# Patient Record
Sex: FEMALE | Race: WHITE | NOT HISPANIC OR LATINO | Employment: FULL TIME | URBAN - METROPOLITAN AREA
[De-identification: names, ages, dates, MRNs, and addresses within clinical notes are randomized per-mention and may not be internally consistent; named-entity substitution may affect disease eponyms.]

---

## 2020-05-11 ENCOUNTER — TELEPHONE (OUTPATIENT)
Dept: FAMILY MEDICINE CLINIC | Facility: CLINIC | Age: 55
End: 2020-05-11

## 2020-05-11 DIAGNOSIS — I10 ESSENTIAL HYPERTENSION: Primary | ICD-10-CM

## 2020-05-11 RX ORDER — METOPROLOL SUCCINATE 50 MG/1
50 TABLET, EXTENDED RELEASE ORAL DAILY
Qty: 30 TABLET | Refills: 5 | Status: SHIPPED | OUTPATIENT
Start: 2020-05-11 | End: 2020-07-01 | Stop reason: SDUPTHER

## 2020-05-11 RX ORDER — ALPRAZOLAM 0.5 MG/1
0.5 TABLET ORAL 2 TIMES DAILY PRN
Qty: 60 TABLET | Refills: 0 | Status: SHIPPED | OUTPATIENT
Start: 2020-05-11 | End: 2020-07-01 | Stop reason: SDUPTHER

## 2020-07-01 ENCOUNTER — TELEPHONE (OUTPATIENT)
Dept: FAMILY MEDICINE CLINIC | Facility: CLINIC | Age: 55
End: 2020-07-01

## 2020-07-01 DIAGNOSIS — I10 ESSENTIAL HYPERTENSION: ICD-10-CM

## 2020-07-01 RX ORDER — METOPROLOL SUCCINATE 50 MG/1
50 TABLET, EXTENDED RELEASE ORAL DAILY
Qty: 30 TABLET | Refills: 5 | Status: SHIPPED | OUTPATIENT
Start: 2020-07-01 | End: 2021-07-12

## 2020-07-01 RX ORDER — ALPRAZOLAM 0.5 MG/1
0.5 TABLET ORAL 2 TIMES DAILY PRN
Qty: 60 TABLET | Refills: 2 | Status: SHIPPED | OUTPATIENT
Start: 2020-07-01 | End: 2020-10-07

## 2020-07-01 NOTE — TELEPHONE ENCOUNTER
Needs a refill for her Alprazolam 0 5mg, 2x's a day as needed    [address] [place] [address],[facility] Floor    Patients ph # [phone]

## 2020-10-06 ENCOUNTER — TELEPHONE (OUTPATIENT)
Dept: FAMILY MEDICINE CLINIC | Facility: CLINIC | Age: 55
End: 2020-10-06

## 2020-10-07 DIAGNOSIS — I10 ESSENTIAL HYPERTENSION: ICD-10-CM

## 2020-10-07 RX ORDER — ALPRAZOLAM 0.5 MG/1
TABLET ORAL
Qty: 60 TABLET | Refills: 0 | Status: SHIPPED | OUTPATIENT
Start: 2020-10-07 | End: 2020-10-22

## 2020-10-19 ENCOUNTER — OFFICE VISIT (OUTPATIENT)
Dept: FAMILY MEDICINE CLINIC | Facility: CLINIC | Age: 55
End: 2020-10-19
Payer: COMMERCIAL

## 2020-10-19 VITALS
HEIGHT: 69 IN | OXYGEN SATURATION: 96 % | WEIGHT: 259.5 LBS | SYSTOLIC BLOOD PRESSURE: 190 MMHG | BODY MASS INDEX: 38.44 KG/M2 | HEART RATE: 91 BPM | TEMPERATURE: 97.5 F | RESPIRATION RATE: 16 BRPM | DIASTOLIC BLOOD PRESSURE: 110 MMHG

## 2020-10-19 DIAGNOSIS — F10.21 HISTORY OF ALCOHOLISM (HCC): ICD-10-CM

## 2020-10-19 DIAGNOSIS — Z72.0 TOBACCO USE: ICD-10-CM

## 2020-10-19 DIAGNOSIS — F41.9 ANXIETY: ICD-10-CM

## 2020-10-19 DIAGNOSIS — E78.5 HYPERLIPIDEMIA, UNSPECIFIED HYPERLIPIDEMIA TYPE: ICD-10-CM

## 2020-10-19 DIAGNOSIS — I10 ESSENTIAL HYPERTENSION: Primary | ICD-10-CM

## 2020-10-19 PROCEDURE — 90471 IMMUNIZATION ADMIN: CPT | Performed by: INTERNAL MEDICINE

## 2020-10-19 PROCEDURE — 3725F SCREEN DEPRESSION PERFORMED: CPT | Performed by: INTERNAL MEDICINE

## 2020-10-19 PROCEDURE — 99214 OFFICE O/P EST MOD 30 MIN: CPT | Performed by: INTERNAL MEDICINE

## 2020-10-19 PROCEDURE — 3080F DIAST BP >= 90 MM HG: CPT | Performed by: INTERNAL MEDICINE

## 2020-10-19 PROCEDURE — 90732 PPSV23 VACC 2 YRS+ SUBQ/IM: CPT | Performed by: INTERNAL MEDICINE

## 2020-10-19 PROCEDURE — 3077F SYST BP >= 140 MM HG: CPT | Performed by: INTERNAL MEDICINE

## 2020-10-19 RX ORDER — METOPROLOL SUCCINATE 50 MG/1
TABLET, EXTENDED RELEASE ORAL
Qty: 60 TABLET | Refills: 5 | Status: SHIPPED | OUTPATIENT
Start: 2020-10-19 | End: 2021-08-03

## 2020-10-19 RX ORDER — ALBUTEROL SULFATE 90 UG/1
2 AEROSOL, METERED RESPIRATORY (INHALATION) EVERY 6 HOURS PRN
Qty: 1 INHALER | Refills: 5 | Status: SHIPPED | OUTPATIENT
Start: 2020-10-19

## 2020-10-22 DIAGNOSIS — I10 ESSENTIAL HYPERTENSION: ICD-10-CM

## 2020-10-22 RX ORDER — ALPRAZOLAM 0.5 MG/1
TABLET ORAL
Qty: 60 TABLET | Refills: 3 | Status: SHIPPED | OUTPATIENT
Start: 2020-10-22 | End: 2021-03-08

## 2021-01-01 ENCOUNTER — HOSPITAL ENCOUNTER (EMERGENCY)
Facility: HOSPITAL | Age: 56
Discharge: HOME | End: 2021-01-01
Attending: EMERGENCY MEDICINE | Admitting: EMERGENCY MEDICINE
Payer: COMMERCIAL

## 2021-01-01 DIAGNOSIS — F19.10 SUBSTANCE ABUSE (HCC): ICD-10-CM

## 2021-01-01 DIAGNOSIS — F22 PARANOIA (HCC): Primary | ICD-10-CM

## 2021-01-01 LAB
AMPHETAMINES SERPL QL SCN: POSITIVE
ANION GAP SERPL CALCULATED.3IONS-SCNC: 9 MMOL/L (ref 4–13)
APAP SERPL-MCNC: <2 UG/ML (ref 10–20)
BARBITURATES UR QL: NEGATIVE
BENZODIAZ UR QL: NEGATIVE
BUN SERPL-MCNC: 11 MG/DL (ref 5–25)
CALCIUM SERPL-MCNC: 9.9 MG/DL (ref 8.3–10.1)
CHLORIDE SERPL-SCNC: 108 MMOL/L (ref 100–108)
CO2 SERPL-SCNC: 23 MMOL/L (ref 21–32)
COCAINE UR QL: NEGATIVE
CREAT SERPL-MCNC: 0.84 MG/DL (ref 0.6–1.3)
ETHANOL SERPL-MCNC: <3 MG/DL (ref 0–3)
GFR SERPL CREATININE-BSD FRML MDRD: 79 ML/MIN/1.73SQ M
GLUCOSE SERPL-MCNC: 116 MG/DL (ref 65–140)
METHADONE UR QL: NEGATIVE
OPIATES UR QL SCN: NEGATIVE
OXYCODONE+OXYMORPHONE UR QL SCN: NEGATIVE
PCP UR QL: NEGATIVE
POTASSIUM SERPL-SCNC: 4.1 MMOL/L (ref 3.5–5.3)
SALICYLATES SERPL-MCNC: <3 MG/DL (ref 3–20)
SODIUM SERPL-SCNC: 140 MMOL/L (ref 136–145)
THC UR QL: POSITIVE
TSH SERPL DL<=0.05 MIU/L-ACNC: 1.41 UIU/ML (ref 0.36–3.74)

## 2021-01-01 PROCEDURE — 80143 DRUG ASSAY ACETAMINOPHEN: CPT | Performed by: EMERGENCY MEDICINE

## 2021-01-01 PROCEDURE — 80048 BASIC METABOLIC PNL TOTAL CA: CPT | Performed by: EMERGENCY MEDICINE

## 2021-01-01 PROCEDURE — 80179 DRUG ASSAY SALICYLATE: CPT | Performed by: EMERGENCY MEDICINE

## 2021-01-01 PROCEDURE — 36415 COLL VENOUS BLD VENIPUNCTURE: CPT | Performed by: EMERGENCY MEDICINE

## 2021-01-01 PROCEDURE — 80307 DRUG TEST PRSMV CHEM ANLYZR: CPT | Performed by: EMERGENCY MEDICINE

## 2021-01-01 PROCEDURE — 84443 ASSAY THYROID STIM HORMONE: CPT | Performed by: EMERGENCY MEDICINE

## 2021-01-01 PROCEDURE — 82077 ASSAY SPEC XCP UR&BREATH IA: CPT | Performed by: EMERGENCY MEDICINE

## 2021-01-01 PROCEDURE — 96374 THER/PROPH/DIAG INJ IV PUSH: CPT

## 2021-01-01 PROCEDURE — 93005 ELECTROCARDIOGRAM TRACING: CPT

## 2021-01-01 PROCEDURE — 99285 EMERGENCY DEPT VISIT HI MDM: CPT | Performed by: EMERGENCY MEDICINE

## 2021-01-01 PROCEDURE — 96361 HYDRATE IV INFUSION ADD-ON: CPT

## 2021-01-01 PROCEDURE — 99285 EMERGENCY DEPT VISIT HI MDM: CPT

## 2021-01-01 RX ORDER — LORAZEPAM 2 MG/ML
1 INJECTION INTRAMUSCULAR ONCE
Status: DISCONTINUED | OUTPATIENT
Start: 2021-01-01 | End: 2021-01-01

## 2021-01-01 RX ORDER — LORAZEPAM 2 MG/ML
2 INJECTION INTRAMUSCULAR ONCE
Status: COMPLETED | OUTPATIENT
Start: 2021-01-01 | End: 2021-01-01

## 2021-01-01 RX ADMIN — LORAZEPAM 2 MG: 2 INJECTION INTRAMUSCULAR; INTRAVENOUS at 21:09

## 2021-01-01 RX ADMIN — SODIUM CHLORIDE 1000 ML: 0.9 INJECTION, SOLUTION INTRAVENOUS at 21:14

## 2021-01-02 VITALS
TEMPERATURE: 97.1 F | DIASTOLIC BLOOD PRESSURE: 93 MMHG | HEART RATE: 98 BPM | WEIGHT: 259 LBS | BODY MASS INDEX: 38.25 KG/M2 | OXYGEN SATURATION: 99 % | SYSTOLIC BLOOD PRESSURE: 197 MMHG | RESPIRATION RATE: 20 BRPM

## 2021-01-02 NOTE — ED PROVIDER NOTES
History  Chief Complaint   Patient presents with    Paranoia     Pt smoked pot in [facility], unsure if marijuana was laced  911 was called because patient was found wandering the streets acting "erratic and paranoid " Patient denies SI/HI and AH/VH but seems extremely paranoid/anxious      Pt is a 49yo F who presents for paranoia  Patient was picked up by EMS for erratic behavior and paranoia  Patient states that she smoked marijuana with some friends in [facility] earlier today  Patient denies any other recreational drug use at that time  Patient states that she got her marijuana from where she usually gets and does not have a new supplier  Patient states she smokes marijuana daily  Patient currently very paranoid and stating that she believes the we are going to kill her  When people walked past the door, patient stating that she believes they are going to kill her as well  Patient realizing that she is paranoid but unable to control the paranoia  Patient denies any SI, HI, AH, VH  Patient reports that she has history of high blood pressure but denies any other medical history  Patient states no recent medication changes recently  HPI and review of systems limited by paranoia and limited patient response  Prior to Admission Medications   Prescriptions Last Dose Informant Patient Reported? Taking?    ALPRAZolam (XANAX) 0 5 mg tablet   No No   Sig: take 1 tablet by mouth twice a day if needed for anxiety   albuterol (PROVENTIL HFA,VENTOLIN HFA) 90 mcg/act inhaler   No No   Sig: Inhale 2 puffs every 6 (six) hours as needed for wheezing or shortness of breath   metoprolol succinate (Toprol XL) 50 mg 24 hr tablet   No No   Sig: Take 1 tablet (50 mg total) by mouth daily   metoprolol succinate (Toprol XL) 50 mg 24 hr tablet   No No   Sig: Take 1 1/2 tablets daily      Facility-Administered Medications: None       Past Medical History:   Diagnosis Date    Alcoholism ([facility] [address] )     Anxiety     Depression     Edema     HBP (high blood pressure)     Hyperlipidemia     Insomnia     Obesity     Onychomycosis     Psoriasis     Tobacco user        Past Surgical History:   Procedure Laterality Date    ESOPHAGOGASTRODUODENOSCOPY N/A 10/05/2012    w/ bx    GASTROINTESTINAL SURGERY      due to GI bleed       Family History   Problem Relation Age of Onset    Heart attack Father     Diabetes Father     Hypertension Father     Alcohol abuse Maternal Grandmother      I have reviewed and agree with the history as documented  E-Cigarette/Vaping    E-Cigarette Use Never User      E-Cigarette/Vaping Substances    Nicotine No     THC No     CBD No     Flavoring No     Other No     Unknown No      Social History     Tobacco Use    Smoking status: Current Every Day Smoker     Packs/day: 1 00     Types: Cigarettes    Smokeless tobacco: Never Used    Tobacco comment: smoked since age 16   Substance Use Topics    Alcohol use: Not Currently     Comment: has been sober for almost 30 days     Drug use: Yes     Types: Marijuana        Review of Systems   Unable to perform ROS: Other (Limited by paranoia)   Constitutional: Negative for fever  Respiratory: Negative for shortness of breath  Cardiovascular: Negative for chest pain  Gastrointestinal: Negative for abdominal pain  Musculoskeletal: Negative for gait problem  Psychiatric/Behavioral: Positive for agitation  Negative for confusion, hallucinations and suicidal ideas  The patient is nervous/anxious          Physical Exam  ED Triage Vitals [01/01/21 2045]   Temperature Pulse Respirations Blood Pressure SpO2   (!) 97 1 °F (36 2 °C) 88 18 (!) 210/118 94 %      Temp Source Heart Rate Source Patient Position - Orthostatic VS BP Location FiO2 (%)   Tympanic Monitor Lying Right arm --      Pain Score       --             Orthostatic Vital Signs  Vitals:    01/01/21 2045 01/01/21 2242 01/01/21 2345   BP: (!) 210/118 (!) 203/90 (!) 197/93   Pulse: 88 97 98   Patient Position - Orthostatic VS: Lying Lying Lying       Physical Exam  Vitals signs reviewed  Constitutional:       Appearance: She is well-developed  HENT:      Head: Normocephalic and atraumatic  Right Ear: External ear normal       Left Ear: External ear normal       Nose: Nose normal       Mouth/Throat:      Mouth: Mucous membranes are dry  Pharynx: Oropharynx is clear  Eyes:      Extraocular Movements: Extraocular movements intact  Conjunctiva/sclera: Conjunctivae normal       Pupils: Pupils are equal, round, and reactive to light  Neck:      Musculoskeletal: Normal range of motion  Cardiovascular:      Rate and Rhythm: Regular rhythm  Tachycardia present  Heart sounds: Normal heart sounds  No murmur  Pulmonary:      Effort: Pulmonary effort is normal  No respiratory distress  Breath sounds: Normal breath sounds  No stridor  No wheezing  Abdominal:      General: Bowel sounds are normal  There is no distension  Palpations: Abdomen is soft  There is no mass  Tenderness: There is no abdominal tenderness  Musculoskeletal: Normal range of motion  Skin:     General: Skin is warm and dry  Capillary Refill: Capillary refill takes less than 2 seconds  Findings: Rash (erythematous patches on scalp) present  Neurological:      Mental Status: She is alert and oriented to person, place, and time  Psychiatric:         Attention and Perception: She is inattentive  She does not perceive auditory or visual hallucinations  Mood and Affect: Mood is anxious  Speech: Speech is rapid and pressured  Behavior: Behavior is agitated  Thought Content: Thought content is paranoid  Thought content does not include homicidal or suicidal ideation  Thought content does not include homicidal or suicidal plan           ED Medications  Medications   LORazepam (ATIVAN) injection 2 mg (2 mg Intravenous Given 1/1/21 2109)   sodium chloride 0 9 % bolus 1,000 mL (0 mL Intravenous Stopped 1/1/21 3065)       Diagnostic Studies  Results Reviewed     Procedure Component Value Units Date/Time    Rapid drug screen, urine [[id number]]  (Abnormal) Collected: 01/01/21 2222    Lab Status: Final result Specimen: Urine, Other Updated: 01/01/21 2252     Amph/Meth UR Positive     Barbiturate Ur Negative     Benzodiazepine Urine Negative     Cocaine Urine Negative     Methadone Urine Negative     Opiate Urine Negative     PCP Ur Negative     THC Urine Positive     Oxycodone Urine Negative    Narrative:      Presumptive report  If requested, specimen will be sent to reference lab for confirmation  FOR MEDICAL PURPOSES ONLY  IF CONFIRMATION NEEDED PLEASE CONTACT THE LAB WITHIN 5 DAYS  Drug Screen Cutoff Levels:  AMPHETAMINE/METHAMPHETAMINES  1000 ng/mL  BARBITURATES     200 ng/mL  BENZODIAZEPINES     200 ng/mL  COCAINE      300 ng/mL  METHADONE      300 ng/mL  OPIATES      300 ng/mL  PHENCYCLIDINE     25 ng/mL  THC       50 ng/mL  OXYCODONE      100 ng/mL    TSH, 3rd generation with Free T4 reflex [[id number]]  (Normal) Collected: 01/01/21 2109    Lab Status: Final result Specimen: Blood from Hand, Right Updated: 01/01/21 2205     TSH 3RD GENERATON 1 410 uIU/mL     Narrative:      Patients undergoing fluorescein dye angiography may retain small amounts of fluorescein in the body for 48-72 hours post procedure  Samples containing fluorescein can produce falsely depressed TSH values  If the patient had this procedure,a specimen should be resubmitted post fluorescein clearance        Basic metabolic panel [[id number]] Collected: 01/01/21 2109    Lab Status: Final result Specimen: Blood from Hand, Right Updated: 01/01/21 2205     Sodium 140 mmol/L      Potassium 4 1 mmol/L      Chloride 108 mmol/L      CO2 23 mmol/L      ANION GAP 9 mmol/L      BUN 11 mg/dL      Creatinine 0 84 mg/dL      Glucose 116 mg/dL      Calcium 9 9 mg/dL      eGFR 79 ml/min/1 73sq m Narrative:      National Kidney Disease Foundation guidelines for Chronic Kidney Disease (CKD):     Stage 1 with normal or high GFR (GFR > 90 mL/min/1 73 square meters)    Stage 2 Mild CKD (GFR = 60-89 mL/min/1 73 square meters)    Stage 3A Moderate CKD (GFR = 45-59 mL/min/1 73 square meters)    Stage 3B Moderate CKD (GFR = 30-44 mL/min/1 73 square meters)    Stage 4 Severe CKD (GFR = 15-29 mL/min/1 73 square meters)    Stage 5 End Stage CKD (GFR <15 mL/min/1 73 square meters)  Note: GFR calculation is accurate only with a steady state creatinine    Salicylate level [[id number]]  (Abnormal) Collected: 01/01/21 2109    Lab Status: Final result Specimen: Blood from Hand, Right Updated: 34/84/81 8188     Salicylate Lvl <3 mg/dL     Acetaminophen level-If concentration is detectable, please discuss with medical  on call  [[id number]]  (Abnormal) Collected: 01/01/21 2109    Lab Status: Final result Specimen: Blood from Hand, Right Updated: 01/01/21 2205     Acetaminophen Level <2 ug/mL     Ethanol [[id number]]  (Normal) Collected: 01/01/21 2109    Lab Status: Final result Specimen: Blood from Hand, Right Updated: 01/01/21 2130     Ethanol Lvl <3 mg/dL                  No orders to display         Procedures  Procedures      ED Course  ED Course as of Jan 03 1743 Fri Jan 01, 2021   2130 ETOH negative  MEDICAL ALCOHOL: <3   2205 Coma panel negative  Coma panel(!)   2205 TSH WNL  TSH, 3rd generation with Free T4 reflex   2205 Reviewed and without actionable derangement  Basic metabolic panel   9752 Pt reporting no further paranoia  Only complaint at this time is 'being tired'  Pt agreeable to EKG and urine sample at this time  2253 UDS shows THC and amphetamines     Rapid drug screen, urine(!)   2315 Procedure Note: EKG  Date/Time: 01/01/21 11:15 PM   Interpreted by: Demetrio Quan MD  Indications / Diagnosis: Tachycardia  ECG reviewed by me, the ED Physician: yes   The EKG demonstrates:  Rhythm: normal sinus  Intervals: normal intervals  Axis: normal axis  QRS/Blocks: normal QRS  ST Changes: No acute ST Changes, no STD/KRISTINE           2344 Pt reports resolution of symptoms and is agreeable to DC  SBIRT 20yo+      Most Recent Value   SBIRT (22 yo +)   In order to provide better care to our patients, we are screening all of our patients for alcohol and drug use  Would it be okay to ask you these screening questions? No Filed at: 01/01/2021 2044                MDM  Number of Diagnoses or Management Options  Paranoia [facility]):   Substance abuse [facility]):   Diagnosis management comments: Pt is a 51yo F who presents with paranoia  Exam pertinent for anxiety, paranoia, and rapid speech  Differential diagnosis to include but not limited to substance abuse, psychiatric break, thyroid dysfunction  Based on patient's presentation of paranoia without known history, will get tox workup including coma panel and UDS  Will also get basic labs and TSH  Will get EKG for tachycardia  Coma panel negative but UDS shows THC and amphetamines  All other labs within normal limits  EKG 2 shows no acute changes and normal sinus rhythm  Upon reassessment, patient reports no further paranoia, however does remember being paranoid  Patient has visitor at this time and states to her visitor that she believed we were all going to kill her  Patient alert and oriented to the situation  Plan to discharge patient with follow-up to PCP  Discussed returning the ED with significant worsening of symptoms or recurrence of symptoms  Pt expressed understanding of discharge instructions and return precautions instructions  All questions were answered and pt was discharged without incident              Amount and/or Complexity of Data Reviewed  Clinical lab tests: ordered and reviewed  Discussion of test results with the performing providers: yes        Disposition  Final diagnoses: Paranoia ([facility] [address] )   Substance abuse ([facility] [address] )     Time reflects when diagnosis was documented in both MDM as applicable and the Disposition within this note     Time User Action Codes Description Comment    1/1/2021 11:40 PM Juan Diego Yvonne Add [F22] Paranoia ([facility] [address] )     1/1/2021 11:40 PM Juan Diego Yvonne Add [F19 10] Substance abuse [facility])       ED Disposition     ED Disposition Condition Date/Time Comment    Discharge Stable Fri Jan 1, 2021 11:40 PM Christy Villa discharge to home/self care  Follow-up Information     Follow up With Specialties Details Why [address], MD Pediatrics, Internal Medicine Call  As needed [address]  [phone] [place] [address]  [phone]            Discharge Medication List as of 1/1/2021 11:42 PM      CONTINUE these medications which have NOT CHANGED    Details   albuterol (PROVENTIL HFA,VENTOLIN HFA) 90 mcg/act inhaler Inhale 2 puffs every 6 (six) hours as needed for wheezing or shortness of breath, Starting Mon 10/19/2020, Normal      ALPRAZolam (XANAX) 0 5 mg tablet take 1 tablet by mouth twice a day if needed for anxiety, Normal      !! metoprolol succinate (Toprol XL) 50 mg 24 hr tablet Take 1 tablet (50 mg total) by mouth daily, Starting Wed 7/1/2020, Normal      !! metoprolol succinate (Toprol XL) 50 mg 24 hr tablet Take 1 1/2 tablets daily, Normal       !! - Potential duplicate medications found  Please discuss with provider  No discharge procedures on file  PDMP Review     None           ED Provider  Attending physically available and evaluated Christy Villa  I managed the patient along with the ED Attending      Electronically Signed by         Garcia Benitez MD  01/03/21 0582

## 2021-01-02 NOTE — ED ATTENDING ATTESTATION
1/1/2021  I, Malcolm Krabbe, MD, saw and evaluated the patient  I have discussed the patient with the resident/non-physician practitioner and agree with the resident's/non-physician practitioner's findings, Plan of Care, and MDM as documented in the resident's/non-physician practitioner's note, except where noted  All available labs and Radiology studies were reviewed  I was present for key portions of any procedure(s) performed by the resident/non-physician practitioner and I was immediately available to provide assistance  At this point I agree with the current assessment done in the Emergency Department  I have conducted an independent evaluation of this patient a history and physical is as follows:  Wandering streets and brought in by police    Patient is paranoid  Thinks people are going to kill her    Was smoking THC   Prior    Has used etoh and cocaine in the past however But states has not used for about 3 months months    I have queried the [place] narcotic database the patient receives monthly prescriptions of Xanax 0 5 milligrams b i d   On 12/17/2020  No distress patient  states she has no psychiatric history  ncat   gcs  15 pupils are dilated   Lungs clear heart rr tachy no m abd soft nt ext normal   The patient is hypervigilant she  is clearly paranoid and she seems to responding to internal stimuli   Pupils reactive however they seem somewhat dilated mucous membranes or dry Neck supple lungs clear heart regular abdomen soft nontender extremities no edema neurologic exam she is awake she is alert she follows commands she denies being suicidal or homicidal  She is hallucinating she feels are people in the room trying to kill her  Impression paranoia Likely due of drug ingestion  Lab workup IV Ativan re-evaluation  ED Course         Critical Care Time  Procedures

## 2021-01-03 LAB
ATRIAL RATE: 100 BPM
P AXIS: 66 DEGREES
PR INTERVAL: 144 MS
QRS AXIS: 60 DEGREES
QRSD INTERVAL: 74 MS
QT INTERVAL: 326 MS
QTC INTERVAL: 420 MS
T WAVE AXIS: 43 DEGREES
VENTRICULAR RATE: 100 BPM

## 2021-01-03 PROCEDURE — 93010 ELECTROCARDIOGRAM REPORT: CPT | Performed by: INTERNAL MEDICINE

## 2021-01-04 LAB
ATRIAL RATE: 101 BPM
P AXIS: 65 DEGREES
PR INTERVAL: 156 MS
QRS AXIS: 61 DEGREES
QRSD INTERVAL: 76 MS
QT INTERVAL: 322 MS
QTC INTERVAL: 417 MS
T WAVE AXIS: 53 DEGREES
VENTRICULAR RATE: 101 BPM

## 2021-01-04 PROCEDURE — 93010 ELECTROCARDIOGRAM REPORT: CPT | Performed by: INTERNAL MEDICINE

## 2021-03-06 DIAGNOSIS — I10 ESSENTIAL HYPERTENSION: ICD-10-CM

## 2021-03-08 RX ORDER — ALPRAZOLAM 0.5 MG/1
TABLET ORAL
Qty: 60 TABLET | Refills: 5 | Status: SHIPPED | OUTPATIENT
Start: 2021-03-08 | End: 2021-09-13

## 2021-06-16 ENCOUNTER — HOSPITAL ENCOUNTER (EMERGENCY)
Facility: HOSPITAL | Age: 56
Discharge: HOME | End: 2021-06-16
Payer: COMMERCIAL

## 2021-06-16 ENCOUNTER — APPOINTMENT (EMERGENCY)
Dept: RADIOLOGY | Facility: HOSPITAL | Age: 56
End: 2021-06-16
Payer: COMMERCIAL

## 2021-06-16 VITALS
WEIGHT: 160 LBS | SYSTOLIC BLOOD PRESSURE: 147 MMHG | RESPIRATION RATE: 18 BRPM | DIASTOLIC BLOOD PRESSURE: 84 MMHG | BODY MASS INDEX: 23.7 KG/M2 | TEMPERATURE: 98.4 F | HEIGHT: 69 IN | OXYGEN SATURATION: 98 % | HEART RATE: 91 BPM

## 2021-06-16 DIAGNOSIS — M54.41 ACUTE RIGHT-SIDED LOW BACK PAIN WITH RIGHT-SIDED SCIATICA: Primary | ICD-10-CM

## 2021-06-16 PROCEDURE — 99284 EMERGENCY DEPT VISIT MOD MDM: CPT

## 2021-06-16 PROCEDURE — 99283 EMERGENCY DEPT VISIT LOW MDM: CPT

## 2021-06-16 PROCEDURE — 72100 X-RAY EXAM L-S SPINE 2/3 VWS: CPT

## 2021-06-16 PROCEDURE — 96372 THER/PROPH/DIAG INJ SC/IM: CPT

## 2021-06-16 RX ORDER — NAPROXEN 500 MG/1
500 TABLET ORAL 2 TIMES DAILY WITH MEALS
Qty: 30 TABLET | Refills: 0 | Status: SHIPPED | OUTPATIENT
Start: 2021-06-16 | End: 2021-07-12

## 2021-06-16 RX ORDER — KETOROLAC TROMETHAMINE 30 MG/ML
30 INJECTION, SOLUTION INTRAMUSCULAR; INTRAVENOUS ONCE
Status: COMPLETED | OUTPATIENT
Start: 2021-06-16 | End: 2021-06-16

## 2021-06-16 RX ORDER — CYCLOBENZAPRINE HCL 10 MG
10 TABLET ORAL 2 TIMES DAILY PRN
Qty: 20 TABLET | Refills: 0 | Status: SHIPPED | OUTPATIENT
Start: 2021-06-16 | End: 2021-07-12

## 2021-06-16 RX ORDER — CYCLOBENZAPRINE HCL 10 MG
10 TABLET ORAL ONCE
Status: COMPLETED | OUTPATIENT
Start: 2021-06-16 | End: 2021-06-16

## 2021-06-16 RX ADMIN — KETOROLAC TROMETHAMINE 30 MG: 30 INJECTION, SOLUTION INTRAMUSCULAR at 05:59

## 2021-06-16 RX ADMIN — CYCLOBENZAPRINE HYDROCHLORIDE 10 MG: 10 TABLET, FILM COATED ORAL at 05:59

## 2021-06-16 NOTE — ED PROVIDER NOTES
History  Chief Complaint   Patient presents with    Back Pain     Chronic back pain aggrevated since picking up "heavy dough at work"  Pain to right lower flank  No meds PTA  62-year-old female known history of hypertension COPD and anxiety presents secondary to low back pain on and off for the past few weeks  Patient states that it has gotten dramatically worse in the past 2 days to the point where she can not get comfortable  Patient was having difficulty sleeping last night secondary to the pain which is why she came to the emergency department  Patient is a denies any trauma  Patient has not seen a physician for this condition in the past   She is not any diagnostic workup including plain x-rays or MRIs  Patient denies any weakness or numbness in her lower extremity but has pain radiating from her right low back into her right buttock into her right leg  Patient denies any loss of continence  No abdominal pain no nausea no vomiting no fever no chills  Prior to Admission Medications   Prescriptions Last Dose Informant Patient Reported? Taking?    ALPRAZolam (XANAX) 0 5 mg tablet Unknown at Unknown time  No No   Sig: take 1 tablet by mouth twice a day if needed for anxiety   albuterol (PROVENTIL HFA,VENTOLIN HFA) 90 mcg/act inhaler Unknown at Unknown time  No No   Sig: Inhale 2 puffs every 6 (six) hours as needed for wheezing or shortness of breath   metoprolol succinate (Toprol XL) 50 mg 24 hr tablet Unknown at Unknown time  No No   Sig: Take 1 tablet (50 mg total) by mouth daily   metoprolol succinate (Toprol XL) 50 mg 24 hr tablet 6/16/2021 at Unknown time  No Yes   Sig: Take 1 1/2 tablets daily      Facility-Administered Medications: None       Past Medical History:   Diagnosis Date    Alcoholism ([facility] [address] )     Anxiety     Depression     Edema     HBP (high blood pressure)     Hyperlipidemia     Insomnia     Obesity     Onychomycosis     Psoriasis     Tobacco user        Past Surgical History:   Procedure Laterality Date    ESOPHAGOGASTRODUODENOSCOPY N/A 10/05/2012    w/ bx    GASTROINTESTINAL SURGERY      due to GI bleed       Family History   Problem Relation Age of Onset    Heart attack Father     Diabetes Father     Hypertension Father     Alcohol abuse Maternal Grandmother      I have reviewed and agree with the history as documented  E-Cigarette/Vaping    E-Cigarette Use Never User      E-Cigarette/Vaping Substances    Nicotine No     THC No     CBD No     Flavoring No     Other No     Unknown No      Social History     Tobacco Use    Smoking status: Current Every Day Smoker     Packs/day: 1 00     Types: Cigarettes    Smokeless tobacco: Never Used    Tobacco comment: smoked since age 16   Vaping Use    Vaping Use: Never used   Substance Use Topics    Alcohol use: Not Currently     Comment: has been sober for almost 30 days     Drug use: Yes     Types: Marijuana       Review of Systems   Constitutional: Negative for chills and fever  HENT: Negative for congestion  Eyes: Negative for visual disturbance  Respiratory: Negative for shortness of breath  Cardiovascular: Negative for chest pain  Gastrointestinal: Negative for abdominal pain  Endocrine: Negative for cold intolerance  Genitourinary: Negative for frequency  Musculoskeletal: Positive for back pain and gait problem  Skin: Negative for rash  Neurological: Negative for dizziness  Psychiatric/Behavioral: Negative for behavioral problems and confusion  Physical Exam  Physical Exam  Vitals and nursing note reviewed  Constitutional:       Appearance: She is well-developed  HENT:      Head: Normocephalic and atraumatic  Eyes:      Conjunctiva/sclera: Conjunctivae normal       Pupils: Pupils are equal, round, and reactive to light  Cardiovascular:      Rate and Rhythm: Normal rate and regular rhythm  Heart sounds: Normal heart sounds     Pulmonary:      Effort: Pulmonary effort is normal       Breath sounds: Normal breath sounds  Abdominal:      General: Bowel sounds are normal       Palpations: Abdomen is soft  Musculoskeletal:      Cervical back: Normal range of motion and neck supple  Comments: Paraspinous tenderness lower back right lower back pain with rotation flexion and extension  Skin:     General: Skin is warm and dry  Capillary Refill: Capillary refill takes less than 2 seconds  Neurological:      Mental Status: She is alert and oriented to person, place, and time  Psychiatric:         Behavior: Behavior normal          Vital Signs  ED Triage Vitals [06/16/21 0541]   Temperature Pulse Respirations Blood Pressure SpO2   98 4 °F (36 9 °C) 91 18 147/84 98 %      Temp Source Heart Rate Source Patient Position - Orthostatic VS BP Location FiO2 (%)   Oral Monitor Lying Right arm --      Pain Score       Worst Possible Pain           Vitals:    06/16/21 0541   BP: 147/84   Pulse: 91   Patient Position - Orthostatic VS: Lying         Visual Acuity      ED Medications  Medications   ketorolac (TORADOL) injection 30 mg (30 mg Intramuscular Given 6/16/21 0559)   cyclobenzaprine (FLEXERIL) tablet 10 mg (10 mg Oral Given 6/16/21 0559)       Diagnostic Studies  Results Reviewed     None                 XR spine lumbar 2 or 3 views injury   ED Interpretation by Roosevelt Winters MD (06/16 9799)   No acute fracture or dislocation  No subluxation  Minor DJD changes                   Procedures  Procedures         ED Course                                           MDM    Disposition  Final diagnoses:   Acute right-sided low back pain with right-sided sciatica     Time reflects when diagnosis was documented in both MDM as applicable and the Disposition within this note     Time User Action Codes Description Comment    6/16/2021  6:25 AM Winsome Shows Add [C08 09] Acute right-sided low back pain with right-sided sciatica       ED Disposition     ED Disposition Condition Date/Time Comment    Discharge Stable Wed Jun 16, 2021  6:25 AM Fani Sanchez discharge to home/self care  Follow-up Information     Follow up With Specialties Details Why [address], MD Internal Medicine, Pediatrics Schedule an appointment as soon as possible for a visit in 3 days will need work up for low back, need MRI low back  [address]  [phone] [facility]            Patient's Medications   Discharge Prescriptions    CYCLOBENZAPRINE (FLEXERIL) 10 MG TABLET    Take 1 tablet (10 mg total) by mouth 2 (two) times a day as needed for muscle spasms       Start Date: 6/16/2021 End Date: --       Order Dose: 10 mg       Quantity: 20 tablet    Refills: 0    NAPROXEN (NAPROSYN) 500 MG TABLET    Take 1 tablet (500 mg total) by mouth 2 (two) times a day with meals       Start Date: 6/16/2021 End Date: --       Order Dose: 500 mg       Quantity: 30 tablet    Refills: 0     No discharge procedures on file      PDMP Review     None          ED Provider  Electronically Signed by           Stefany Peoples MD  06/16/21 15 Savi Gibson MD  06/16/21 6526

## 2021-06-16 NOTE — Clinical Note
Sofya Merchant was seen and treated in our emergency department on 6/16/2021  Diagnosis:     Adriane Leigh  may return to work on return date  She may return on this date: 06/18/2021    Due to work injury     If you have any questions or concerns, please don't hesitate to call        Juan A Klein MD    ______________________________           _______________          _______________  Hospital Representative                              Date                                Time

## 2021-07-12 ENCOUNTER — OFFICE VISIT (OUTPATIENT)
Dept: FAMILY MEDICINE CLINIC | Facility: CLINIC | Age: 56
End: 2021-07-12
Payer: COMMERCIAL

## 2021-07-12 VITALS
RESPIRATION RATE: 20 BRPM | TEMPERATURE: 97.7 F | HEART RATE: 68 BPM | BODY MASS INDEX: 31.67 KG/M2 | OXYGEN SATURATION: 98 % | SYSTOLIC BLOOD PRESSURE: 150 MMHG | DIASTOLIC BLOOD PRESSURE: 90 MMHG | HEIGHT: 69 IN | WEIGHT: 213.8 LBS

## 2021-07-12 DIAGNOSIS — M54.50 CHRONIC BILATERAL LOW BACK PAIN WITHOUT SCIATICA: Primary | ICD-10-CM

## 2021-07-12 DIAGNOSIS — G89.29 CHRONIC BILATERAL LOW BACK PAIN WITHOUT SCIATICA: Primary | ICD-10-CM

## 2021-07-12 PROCEDURE — 3008F BODY MASS INDEX DOCD: CPT | Performed by: FAMILY MEDICINE

## 2021-07-12 PROCEDURE — 99214 OFFICE O/P EST MOD 30 MIN: CPT | Performed by: FAMILY MEDICINE

## 2021-07-12 RX ORDER — BACLOFEN 10 MG/1
10 TABLET ORAL 2 TIMES DAILY PRN
Qty: 20 TABLET | Refills: 0 | Status: SHIPPED | OUTPATIENT
Start: 2021-07-12

## 2021-07-12 RX ORDER — MELOXICAM 15 MG/1
15 TABLET ORAL DAILY
Qty: 30 TABLET | Refills: 0 | Status: SHIPPED | OUTPATIENT
Start: 2021-07-12

## 2021-07-12 NOTE — PROGRESS NOTES
Subjective:      Patient ID: Carmen Ram is a 48 y o  female  [place] [employer],was doing dishes, making ,,pizza, removed trash,  got a promotion- was told " can you help out do the dough", has to lift heavy bags of flour- "does not know how heavy the bag is" , after her 6/16/2021 visit in the ER she was told  That she will help with lifting but did not get that desired help as promised  She did not have any back problems or pain prior to this episode, generally in good health no other problems  She is a recovering alcoholic and is currently in remission since rehab, doing well      Past Medical History:   Diagnosis Date    Alcoholism ([facility] [address] )     Anxiety     Depression     Edema     HBP (high blood pressure)     Hyperlipidemia     Insomnia     Obesity     Onychomycosis     Psoriasis     Tobacco user        Family History   Problem Relation Age of Onset    Heart attack Father     Diabetes Father     Hypertension Father     Alcohol abuse Maternal Grandmother        Past Surgical History:   Procedure Laterality Date    ESOPHAGOGASTRODUODENOSCOPY N/A 10/05/2012    w/ bx    GASTROINTESTINAL SURGERY      due to GI bleed        reports that she has been smoking cigarettes  She has been smoking about 1 00 pack per day  She has never used smokeless tobacco  She reports previous alcohol use  She reports current drug use  Drug: Marijuana        Current Outpatient Medications:     albuterol (PROVENTIL HFA,VENTOLIN HFA) 90 mcg/act inhaler, Inhale 2 puffs every 6 (six) hours as needed for wheezing or shortness of breath, Disp: 1 Inhaler, Rfl: 5    ALPRAZolam (XANAX) 0 5 mg tablet, take 1 tablet by mouth twice a day if needed for anxiety, Disp: 60 tablet, Rfl: 5    cyclobenzaprine (FLEXERIL) 10 mg tablet, Take 1 tablet (10 mg total) by mouth 2 (two) times a day as needed for muscle spasms, Disp: 20 tablet, Rfl: 0    metoprolol succinate (Toprol XL) 50 mg 24 hr tablet, Take 1 1/2 tablets daily, Disp: 60 tablet, Rfl: 5    naproxen (NAPROSYN) 500 mg tablet, Take 1 tablet (500 mg total) by mouth 2 (two) times a day with meals, Disp: 30 tablet, Rfl: 0    The following portions of the patient's history were reviewed and updated as appropriate: allergies, current medications, past family history, past medical history, past social history, past surgical history and problem list     Review of Systems   Constitutional: Negative for fatigue and fever  HENT: Negative for congestion, facial swelling, mouth sores, rhinorrhea, sore throat and trouble swallowing  Eyes: Negative for pain and redness  Respiratory: Negative for cough, shortness of breath and wheezing  Cardiovascular: Negative for chest pain, palpitations and leg swelling  Gastrointestinal: Negative for abdominal pain, blood in stool, constipation, diarrhea and nausea  Genitourinary: Negative for dysuria, hematuria and urgency  Musculoskeletal: Positive for back pain  Negative for arthralgias and myalgias  Skin: Negative for rash and wound  Neurological: Negative for seizures, syncope and headaches  Hematological: Negative for adenopathy  Psychiatric/Behavioral: Negative for agitation and behavioral problems  PHQ-9 Depression Screening    PHQ-9:   Frequency of the following problems over the past two weeks:           [unfilled]    Objective:    /90 (BP Location: Left arm, Patient Position: Sitting, Cuff Size: Adult)   Pulse 68   Temp 97 7 °F (36 5 °C) (Temporal)   Resp 20   Ht 5' 9" (1 753 m)   Wt 97 kg (213 lb 12 8 oz)   SpO2 98%   BMI 31 57 kg/m²      Physical Exam  Vitals and nursing note reviewed  Constitutional:       Appearance: Normal appearance  She is well-developed  HENT:      Head: Normocephalic and atraumatic  Right Ear: External ear normal       Left Ear: External ear normal       Nose: Nose normal    Eyes:      General: No scleral icterus  Right eye: No discharge           Left eye: No discharge  Conjunctiva/sclera: Conjunctivae normal       Pupils: Pupils are equal, round, and reactive to light  Neck:      Thyroid: No thyromegaly  Cardiovascular:      Rate and Rhythm: Normal rate and regular rhythm  Heart sounds: Normal heart sounds  No murmur heard  No gallop  Pulmonary:      Effort: Pulmonary effort is normal       Breath sounds: Normal breath sounds  No wheezing or rales  Abdominal:      General: There is no distension  Palpations: Abdomen is soft  Tenderness: There is no abdominal tenderness  Musculoskeletal:         General: Tenderness (mid lumbar spine tenderness) present  No deformity  Cervical back: Normal range of motion and neck supple  Lumbar back: Tenderness present  No bony tenderness  Normal range of motion  Negative right straight leg raise test and negative left straight leg raise test    Lymphadenopathy:      Cervical: No cervical adenopathy  Skin:     Findings: No erythema or rash  Neurological:      General: No focal deficit present  Mental Status: She is alert  Mental status is at baseline  Psychiatric:         Mood and Affect: Mood normal          Behavior: Behavior normal              Radiology/Other Diagnostic Testing Results: I have personally reviewed pertinent reports  XR spine lumbar 2 or 3 views injury    Result Date: 6/16/2021  LUMBAR SPINE INDICATION:   low back pain right leg sciatica  COMPARISON:  None VIEWS:  XR SPINE LUMBAR 2 OR 3 VIEWS INJURY Images: 3 FINDINGS: There are 5 non rib bearing lumbar vertebral bodies  There is no evidence of acute fracture or destructive osseous lesion  Mild dextroscoliosis, apex L3-4 Mild degenerative disc disease L5-S1 The pedicles appear intact  Soft tissues are unremarkable  Mild degenerative disc disease L5-S1  Scoliosis   No acute abnormalities identified Workstation performed: [id number]        Assessment/Plan:         Diagnoses and all orders for this visit:    Chronic bilateral low back pain without sciatica  -     Ambulatory referral to Physical Therapy; Future  -     meloxicam (MOBIC) 15 mg tablet; Take 1 tablet (15 mg total) by mouth daily  -     baclofen 10 mg tablet; Take 1 tablet (10 mg total) by mouth 2 (two) times a day as needed for muscle spasms (for severe back pain, can make you sedated)        Meloxicam for pain daily for 5 days and as needed thereafter, warned about gastritis, take it with food  Baclofen prn for muscle spasm- discussed can make her sedated and avoid driving and operating heavy machinery for 2 hours after taking the medication or if she feels that her reflexes are impaired  Work restrictions given for no lifting >20 lbs  Specifically discussed TO DISCONTINUE NAPROXEN AND CYCLOBENZAPRINE  Discussed importance of maintaining daily activity and avoiding sedentary lifestyle which can worsen back pain  Demonstrated ROM exercises, stretches, and core strengthening with patient  Advised patient to use warm compresses or warm shower prior to stretching  Discussed proper body mechanics (bending and lifting techniques) to prevent further injury  Weight loss can improve low back symptoms  REST: Rest from aggravating activity  Avoid prolonged sitting, driving, bending, heavy lifting and twisting  ICE: Ice applied to the low back for 15 minutes every 1 - 2 hours is helpful in reducing pain and spasm  Avoid using heat for the first 48 hours of an acute injury  NSAIDs: Anti-inflammatory medication [unless prescribed by me] such as aspirin, advil, aleve, ibuprofen or naproxen sodium can help with pain  EARLY EXERCISE: Do not take over the counter NSAIDs if prescribed an NSAID  Gentle exercise for mobility and stretching (especially the muscles of the legs and back) can help decrease the severity, duration and recurrence of low back pain  Do not perform exercises that increase your pain    POSITIONING: Modifying your sleeping position can help ease strain to your low back  Make sure your bed is firm enough to give you adequate support, and use a small pillow for you head  If you sleep on your back, try putting a pillow under your knees  Or if you prefer to sleep side lying, put a pillow between your thighs and if you are side bent, a folded towel under your waistline  PREVENTION:  Once the severity of pain has decreased, a rehabilitation program to strengthen your hip, abdominal and back muscles can help prevent recurrences  The goal is neutral spine, not slumped or over-arched  Proper lifting and body mechanics  Follow-up with primary care physician for and physical and follow-up in 3 weeks  Read package inserts for all medications before starting a new medications, call me if you have any questions  Patient was given opportunity to ask questions and all questions were answered  Portions of the record may have been created with voice recognition software  Occasional wrong word or "sound a like" substitutions may have occurred due to the inherent limitations of voice recognition software  Read the chart carefully and recognize, using context, where substitutions have occurred

## 2021-07-12 NOTE — LETTER
July 12, 2021     Patient: Yanna Ahumada   YOB: 1965   Date of Visit: 7/12/2021       To Whom it May Concern:    Katt Yeager is under my professional care  She was seen in my office on 7/12/2021  She may return to work on 7/14/2021 with limitations of lifting no more than 20 lbs       If you have any questions or concerns, please don't hesitate to call           Sincerely,          Wilder Hendrix MD        CC: No Recipients

## 2021-08-03 DIAGNOSIS — I10 ESSENTIAL HYPERTENSION: ICD-10-CM

## 2021-08-03 RX ORDER — METOPROLOL SUCCINATE 50 MG/1
TABLET, EXTENDED RELEASE ORAL
Qty: 60 TABLET | Refills: 5 | Status: SHIPPED | OUTPATIENT
Start: 2021-08-03 | End: 2021-12-27 | Stop reason: SDUPTHER

## 2021-09-13 DIAGNOSIS — I10 ESSENTIAL HYPERTENSION: ICD-10-CM

## 2021-09-13 RX ORDER — ALPRAZOLAM 0.5 MG/1
TABLET ORAL
Qty: 60 TABLET | Refills: 0 | Status: SHIPPED | OUTPATIENT
Start: 2021-09-13 | End: 2021-10-25

## 2021-10-24 DIAGNOSIS — I10 ESSENTIAL HYPERTENSION: ICD-10-CM

## 2021-10-25 RX ORDER — ALPRAZOLAM 0.5 MG/1
TABLET ORAL
Qty: 60 TABLET | Refills: 0 | Status: SHIPPED | OUTPATIENT
Start: 2021-10-25 | End: 2021-11-26

## 2021-11-24 DIAGNOSIS — I10 ESSENTIAL HYPERTENSION: ICD-10-CM

## 2021-11-26 RX ORDER — ALPRAZOLAM 0.5 MG/1
TABLET ORAL
Qty: 60 TABLET | Refills: 0 | Status: SHIPPED | OUTPATIENT
Start: 2021-11-26 | End: 2021-12-27 | Stop reason: SDUPTHER

## 2021-12-27 DIAGNOSIS — I10 ESSENTIAL HYPERTENSION: ICD-10-CM

## 2021-12-27 DIAGNOSIS — F41.9 ANXIETY: Primary | ICD-10-CM

## 2021-12-27 RX ORDER — ALPRAZOLAM 0.5 MG/1
0.5 TABLET ORAL 2 TIMES DAILY PRN
Qty: 60 TABLET | Refills: 1 | Status: SHIPPED | OUTPATIENT
Start: 2021-12-27 | End: 2022-02-16 | Stop reason: SDUPTHER

## 2021-12-27 RX ORDER — METOPROLOL SUCCINATE 50 MG/1
TABLET, EXTENDED RELEASE ORAL
Qty: 60 TABLET | Refills: 5 | Status: SHIPPED | OUTPATIENT
Start: 2021-12-27

## 2022-02-16 ENCOUNTER — TELEPHONE (OUTPATIENT)
Dept: FAMILY MEDICINE CLINIC | Facility: CLINIC | Age: 57
End: 2022-02-16

## 2022-02-16 DIAGNOSIS — F41.9 ANXIETY: ICD-10-CM

## 2022-02-16 RX ORDER — ALPRAZOLAM 0.5 MG/1
0.5 TABLET ORAL 2 TIMES DAILY PRN
Qty: 60 TABLET | Refills: 1 | Status: SHIPPED | OUTPATIENT
Start: 2022-02-16 | End: 2022-04-18 | Stop reason: SDUPTHER

## 2022-04-18 DIAGNOSIS — F41.9 ANXIETY: ICD-10-CM

## 2022-04-18 RX ORDER — ALPRAZOLAM 0.5 MG/1
0.5 TABLET ORAL 2 TIMES DAILY PRN
Qty: 60 TABLET | Refills: 1 | Status: SHIPPED | OUTPATIENT
Start: 2022-04-18

## 2022-04-18 NOTE — TELEPHONE ENCOUNTER
Refill:  Alprazolam 0 5mg  1 2xday as needed    (Leaving for [place] end of week, if she could please have it by then?)  [facility]

## 2022-10-04 ENCOUNTER — OFFICE VISIT (OUTPATIENT)
Dept: FAMILY MEDICINE CLINIC | Facility: CLINIC | Age: 57
End: 2022-10-04
Payer: MEDICARE

## 2022-10-04 VITALS
TEMPERATURE: 97.7 F | WEIGHT: 226.38 LBS | BODY MASS INDEX: 33.53 KG/M2 | DIASTOLIC BLOOD PRESSURE: 100 MMHG | HEIGHT: 69 IN | RESPIRATION RATE: 18 BRPM | OXYGEN SATURATION: 97 % | SYSTOLIC BLOOD PRESSURE: 200 MMHG | HEART RATE: 114 BPM

## 2022-10-04 DIAGNOSIS — Z28.21 PNEUMOCOCCAL VACCINATION DECLINED: ICD-10-CM

## 2022-10-04 DIAGNOSIS — F10.21 HISTORY OF ALCOHOLISM (HCC): ICD-10-CM

## 2022-10-04 DIAGNOSIS — Z28.21 TETANUS, DIPHTHERIA, AND ACELLULAR PERTUSSIS (TDAP) VACCINATION DECLINED: ICD-10-CM

## 2022-10-04 DIAGNOSIS — F41.9 ANXIETY: ICD-10-CM

## 2022-10-04 DIAGNOSIS — Z11.59 NEED FOR HEPATITIS C SCREENING TEST: ICD-10-CM

## 2022-10-04 DIAGNOSIS — E78.5 HYPERLIPIDEMIA, UNSPECIFIED HYPERLIPIDEMIA TYPE: ICD-10-CM

## 2022-10-04 DIAGNOSIS — Z03.818 ENCOUNTER FOR OBSERVATION FOR SUSPECTED EXPOSURE TO OTHER BIOLOGICAL AGENTS RULED OUT: ICD-10-CM

## 2022-10-04 DIAGNOSIS — Z12.2 SCREENING FOR LUNG CANCER: ICD-10-CM

## 2022-10-04 DIAGNOSIS — Z20.822 EXPOSURE TO COVID-19 VIRUS: ICD-10-CM

## 2022-10-04 DIAGNOSIS — F33.2 SEVERE EPISODE OF RECURRENT MAJOR DEPRESSIVE DISORDER, WITHOUT PSYCHOTIC FEATURES (HCC): ICD-10-CM

## 2022-10-04 DIAGNOSIS — Z72.0 TOBACCO USE: ICD-10-CM

## 2022-10-04 DIAGNOSIS — Z28.21 INFLUENZA VACCINATION DECLINED: ICD-10-CM

## 2022-10-04 DIAGNOSIS — Z12.11 COLON CANCER SCREENING: ICD-10-CM

## 2022-10-04 DIAGNOSIS — Z28.21 COVID-19 VACCINATION DECLINED: ICD-10-CM

## 2022-10-04 DIAGNOSIS — Z11.4 ENCOUNTER FOR SCREENING FOR HIV: ICD-10-CM

## 2022-10-04 DIAGNOSIS — F10.10 ALCOHOL ABUSE: ICD-10-CM

## 2022-10-04 DIAGNOSIS — Z12.4 CERVICAL CANCER SCREENING: ICD-10-CM

## 2022-10-04 DIAGNOSIS — Z00.00 ANNUAL PHYSICAL EXAM: Primary | ICD-10-CM

## 2022-10-04 DIAGNOSIS — Z12.31 BREAST CANCER SCREENING BY MAMMOGRAM: ICD-10-CM

## 2022-10-04 DIAGNOSIS — I10 ESSENTIAL HYPERTENSION: ICD-10-CM

## 2022-10-04 PROBLEM — F32.9 MAJOR DEPRESSIVE DISORDER: Status: ACTIVE | Noted: 2022-10-04

## 2022-10-04 LAB
SARS-COV-2 AG UPPER RESP QL IA: NEGATIVE
VALID CONTROL: NORMAL

## 2022-10-04 PROCEDURE — 99214 OFFICE O/P EST MOD 30 MIN: CPT | Performed by: INTERNAL MEDICINE

## 2022-10-04 PROCEDURE — 87811 SARS-COV-2 COVID19 W/OPTIC: CPT | Performed by: INTERNAL MEDICINE

## 2022-10-04 PROCEDURE — 99396 PREV VISIT EST AGE 40-64: CPT | Performed by: INTERNAL MEDICINE

## 2022-10-04 RX ORDER — ALPRAZOLAM 0.5 MG/1
1 TABLET ORAL 2 TIMES DAILY PRN
Qty: 60 TABLET | Refills: 0 | Status: CANCELLED | OUTPATIENT
Start: 2022-10-04

## 2022-10-04 RX ORDER — METOPROLOL SUCCINATE 100 MG/1
TABLET, EXTENDED RELEASE ORAL
Qty: 30 TABLET | Refills: 3 | Status: SHIPPED | OUTPATIENT
Start: 2022-10-04

## 2022-10-04 RX ORDER — ALPRAZOLAM 1 MG/1
1 TABLET ORAL 2 TIMES DAILY PRN
Qty: 60 TABLET | Refills: 0 | Status: SHIPPED | OUTPATIENT
Start: 2022-10-04

## 2022-10-04 NOTE — ASSESSMENT & PLAN NOTE
Pt does relapse especially with stress, loss of job, loss of pets, but we spoke about things and she is trying not to drink

## 2022-10-04 NOTE — PROGRESS NOTES
[facility]    NAME: Dawn Santacruz  AGE: 65 y o   SEX: female  : 1965     DATE: 10/4/2022     Assessment and Plan:     Problem List Items Addressed This Visit        Cardiovascular and Mediastinum    Essential hypertension     BP very elevated, but patient is very anxious too-she just had to take her two cats to a boarding facility because of her landlord, and is lost without them-bp upon retake is elevated and we did discuss low sodium diet and increased her metoprolol to 100 mg daily-labs were ordered-will resee her in 6 weeks         Relevant Medications    metoprolol succinate (TOPROL-XL) 100 mg 24 hr tablet    Other Relevant Orders    CBC and differential    Comprehensive metabolic panel    Lipid panel    TSH, 3rd generation       Other    Hyperlipidemia    Relevant Orders    CBC and differential    Comprehensive metabolic panel    Lipid panel    TSH, 3rd generation    Tobacco use     Counseled on cessation, and ordered LDCt for lung cancer screening         History of alcoholism ([facility] [address] )     Pt does relapse especially with stress, loss of job, loss of pets, but we spoke about things and she is trying not to drink         Anxiety     We discussed use of a daily SSRI but she does not want to do that right now-instead she asked for her alprazolam rx bid-written for -we did discuss not overusing, addiction potential, etc-a controlled substance agreement was also signed-I also wrote a letter of medical necessity designating her 2 cats as her therapy/emotional support animals as her not having them with her at the moment is a large part of her stress and anxiety, which also makes her bp high         Relevant Medications    ALPRAZolam (XANAX) 1 mg tablet    Major depressive disorder    Relevant Medications    ALPRAZolam (XANAX) 1 mg tablet      Other Visit Diagnoses     Annual physical exam    -  Primary    Relevant Orders    CBC and differential    Comprehensive metabolic panel    Lipid panel    TSH, 3rd generation    Alcohol abuse        BMI 33 0-33 9,adult        Influenza vaccination declined        COVID-19 vaccination declined        Tetanus, diphtheria, and acellular pertussis (Tdap) vaccination declined        Pneumococcal vaccination declined        Breast cancer screening by mammogram        Relevant Orders    Mammo screening bilateral w 3d & cad    Colon cancer screening        Relevant Orders    Ambulatory referral for colonoscopy    Exposure to COVID-19 virus        Relevant Orders    Poct Covid 19 Rapid Antigen Test (Completed)    Encounter for observation for suspected exposure to other biological agents ruled out        Relevant Orders    Poct Covid 19 Rapid Antigen Test (Completed)    Need for hepatitis C screening test        Relevant Orders    Hepatitis C antibody    Encounter for screening for HIV        Relevant Orders    HIV 1/2 Antigen/Antibody (4th Generation) w Reflex [facility]    Screening for lung cancer        Relevant Orders    CT lung screening program    Cervical cancer screening        Will do GYN referral next time          Immunizations and preventive care screenings were discussed with patient today  Appropriate education was printed on patient's after visit summary  Counseling:  Alcohol/drug use: discussed moderation in alcohol intake, the recommendations for healthy alcohol use, and avoidance of illicit drug use  Dental Health: discussed importance of regular tooth brushing, flossing, and dental visits  · Exercise: the importance of regular exercise/physical activity was discussed  Recommend exercise 3-5 times per week for at least 30 minutes  Return in about 6 weeks (around 11/15/2022)       Chief Complaint:     Chief Complaint   Patient presents with    Physical Exam      History of Present Illness:     Adult Annual Physical   Patient here for a comprehensive physical exam  The patient reports problems - anxiety, alcohol use, elevated blood pressure, just surrenedered her cats  Diet and Physical Activity  · Diet/Nutrition: well balanced diet  · Exercise: no formal exercise  Depression Screening  PHQ-2/9 Depression Screening    Little interest or pleasure in doing things: 0 - not at all  Feeling down, depressed, or hopeless: 0 - not at all  PHQ-2 Score: 0  PHQ-2 Interpretation: Negative depression screen       General Health  · Sleep: sleeps poorly  · Hearing: normal - bilateral   · Vision: no vision problems  · Dental: regular dental visits  /GYN Health  · Patient is: postmenopausal  · Last menstrual period: awhile ago  · Contraceptive method: none  Review of Systems:     Review of Systems   Constitutional: Negative  HENT: Negative  Respiratory: Negative  Cardiovascular: Negative  Gastrointestinal: Negative  Neurological: Negative  Psychiatric/Behavioral: Positive for decreased concentration, dysphoric mood and sleep disturbance  The patient is nervous/anxious         Past Medical History:     Past Medical History:   Diagnosis Date    Alcoholism ([address] )     Anxiety     Depression     Edema     HBP (high blood pressure)     Hyperlipidemia     Insomnia     Obesity     Onychomycosis     Psoriasis     Tobacco user       Past Surgical History:     Past Surgical History:   Procedure Laterality Date    ESOPHAGOGASTRODUODENOSCOPY N/A 10/05/2012    w/ bx    GASTROINTESTINAL SURGERY      due to GI bleed      Social History:     Social History     Socioeconomic History    Marital status: Single     Spouse name: None    Number of children: None    Years of education: None    Highest education level: None   Occupational History    None   Tobacco Use    Smoking status: Current Every Day Smoker     Packs/day: 1 00     Types: Cigarettes    Smokeless tobacco: Never Used    Tobacco comment: smoked since age 16   Vaping Use    Vaping Use: Never used   Substance and Sexual Activity    Alcohol use: Yes     Comment: 10/4/22 - 6 Cans of beer a day - CK    Drug use: Yes     Types: Marijuana    Sexual activity: None   Other Topics Concern    None   Social History Narrative    Tobacco smoking status: Current every day smoker     Smoking - how much: 1 PPD    Do you currently or have you served in the [employer]: No    Were you activated, into active duty, as a member of the [employer] or as a Reservist: No    Occupation: [employer]    Sexual orientation: Homosexual    Exercise level: Occasional    Diet: Regular    General stress level: High    Has smoked since age: 16    Alcohol intake: Heavy- alcoholism    Chewing tobacco: none     Social Determinants of Health     Financial Resource Strain: Not on file   Food Insecurity: Not on file   Transportation Needs: Not on file   Physical Activity: Not on file   Stress: Not on file   Social Connections: Not on file   Intimate Partner Violence: Not on file   Housing Stability: Not on file      Family History:     Family History   Problem Relation Age of Onset    No Known Problems Mother     Heart attack Father     Diabetes Father     Hypertension Father     Alcohol abuse Maternal Grandmother       Current Medications:     Current Outpatient Medications   Medication Sig Dispense Refill    ALPRAZolam (XANAX) 1 mg tablet Take 1 tablet (1 mg total) by mouth 2 (two) times a day as needed for anxiety 60 tablet 0    metoprolol succinate (TOPROL-XL) 100 mg 24 hr tablet Take 1 tablet daily 30 tablet 3    albuterol (PROVENTIL HFA,VENTOLIN HFA) 90 mcg/act inhaler Inhale 2 puffs every 6 (six) hours as needed for wheezing or shortness of breath (Patient not taking: Reported on 10/4/2022) 1 Inhaler 5     No current facility-administered medications for this visit  Allergies:      Allergies   Allergen Reactions    Bupropion Other (See Comments)     DID NOT TOLERATE MEDICATION "COULD NOT FUNCTION" Physical Exam:     BP (!) 200/100   Pulse (!) 114   Temp 97 7 °F (36 5 °C) (Temporal)   Resp 18   Ht 5' 9" (1 753 m)   Wt 103 kg (226 lb 6 oz)   SpO2 97%   BMI 33 43 kg/m²     Physical Exam  Vitals reviewed  Constitutional:       Appearance: Normal appearance  HENT:      Head: Normocephalic and atraumatic  Right Ear: External ear normal       Left Ear: External ear normal       Nose: Nose normal       Mouth/Throat:      Mouth: Mucous membranes are moist    Eyes:      Extraocular Movements: Extraocular movements intact  Pupils: Pupils are equal, round, and reactive to light  Neck:      Vascular: No carotid bruit  Cardiovascular:      Rate and Rhythm: Normal rate and regular rhythm  Heart sounds: Normal heart sounds  Pulmonary:      Effort: Pulmonary effort is normal       Breath sounds: Normal breath sounds  Abdominal:      General: Abdomen is flat  There is no distension  Palpations: Abdomen is soft  Tenderness: There is no abdominal tenderness  Musculoskeletal:         General: Normal range of motion  Cervical back: Normal range of motion and neck supple  Skin:     Capillary Refill: Capillary refill takes less than 2 seconds  Neurological:      General: No focal deficit present  Mental Status: She is alert and oriented to person, place, and time  Psychiatric:      Comments: Anxious, tearful          James Montalvo MD  [facility]     BMI Counseling: Body mass index is 33 43 kg/m²  The BMI is above normal  Nutrition recommendations include reducing portion sizes, decreasing overall calorie intake, 3-5 servings of fruits/vegetables daily, reducing fast food intake, consuming healthier snacks, decreasing soda and/or juice intake, moderation in carbohydrate intake, increasing intake of lean protein, reducing intake of saturated fat and trans fat and reducing intake of cholesterol   Exercise recommendations include exercising 3-5 times per week, joining a gym and strength training exercises

## 2022-10-04 NOTE — ASSESSMENT & PLAN NOTE
BP very elevated, but patient is very anxious too-she just had to take her two cats to a boarding facility because of her landlord, and is lost without them-bp upon retake is elevated and we did discuss low sodium diet and increased her metoprolol to 100 mg daily-labs were ordered-will resee her in 6 weeks

## 2022-10-04 NOTE — PATIENT INSTRUCTIONS

## 2022-10-04 NOTE — LETTER
Dear Priscilla Cameron:    Laz Puentes  67, is a patient of mine and is under my professional care  Kehinde medina suffers from a host of chronic medical conditions, not the least of which are her anxiety, depression, stage 2 high blood pressure, and history of reliance on alcohol  Kehinde medina was seen in my office today, 10/4/22  In addition to being followed by physicians, taking medications, and counseling, it was recommended, if not "prescribed" that Kehinde medina have pets to take care of as part of her therapy plan  Kehinde medina owns 2 well cared for cats, Jose and Jose Juan, who have helped her immensely with her medical conditions  When she's playing and caring for her cats, she does not think about drinking alcohol, and they help immensely with her anxiety and depressed state  Without them, she can't sleep, and looks for them constantly when they are not there  Abby's anxiety contributes to her having very elevated blood pressure, and, although she's following diet recommendations and is taking her medications for blood pressure as prescribed, her blood pressure of late has been running extremely high  The concern with having extremely elevated blood pressure is the potential of a life altering if not threatening, stroke  As you can see, her cats play a vital role in her overall well being, and it is medically, if not emotionally, necessary for her cats to remain with her at all times  Both cats are vetted and vaccinated in full  As Orion Long, and on her behalf, this letter is being written in support of her 2 cats, Jose and Jose Juan, being designated as both her therapy animals as well as emotional support animals  Thanks for your time and attention in this matter          Sincerely,    Dr Diana Townsend

## 2022-11-01 DIAGNOSIS — F41.9 ANXIETY: ICD-10-CM

## 2022-11-01 RX ORDER — ALPRAZOLAM 1 MG/1
TABLET ORAL
Qty: 60 TABLET | Refills: 0 | Status: SHIPPED | OUTPATIENT
Start: 2022-11-01

## 2022-11-30 DIAGNOSIS — F41.9 ANXIETY: ICD-10-CM

## 2022-11-30 RX ORDER — ALPRAZOLAM 1 MG/1
TABLET ORAL
Qty: 60 TABLET | Refills: 2 | Status: SHIPPED | OUTPATIENT
Start: 2022-11-30

## 2023-01-25 DIAGNOSIS — I10 ESSENTIAL HYPERTENSION: ICD-10-CM

## 2023-01-25 RX ORDER — METOPROLOL SUCCINATE 100 MG/1
TABLET, EXTENDED RELEASE ORAL
Qty: 30 TABLET | Refills: 3 | Status: SHIPPED | OUTPATIENT
Start: 2023-01-25

## 2023-02-21 DIAGNOSIS — F41.9 ANXIETY: ICD-10-CM

## 2023-02-21 RX ORDER — ALPRAZOLAM 1 MG/1
TABLET ORAL
Qty: 60 TABLET | Refills: 0 | Status: SHIPPED | OUTPATIENT
Start: 2023-02-21

## 2023-03-08 ENCOUNTER — HOSPITAL ENCOUNTER (EMERGENCY)
Facility: HOSPITAL | Age: 58
Discharge: HOME | End: 2023-03-08
Attending: EMERGENCY MEDICINE | Admitting: EMERGENCY MEDICINE

## 2023-03-08 VITALS
DIASTOLIC BLOOD PRESSURE: 87 MMHG | HEART RATE: 95 BPM | RESPIRATION RATE: 18 BRPM | TEMPERATURE: 98.6 F | OXYGEN SATURATION: 99 % | SYSTOLIC BLOOD PRESSURE: 174 MMHG

## 2023-03-08 DIAGNOSIS — B35.1 ONYCHOMYCOSIS: Primary | ICD-10-CM

## 2023-03-08 DIAGNOSIS — M79.671 PAIN IN BOTH FEET: ICD-10-CM

## 2023-03-08 DIAGNOSIS — Z59.00 HOMELESSNESS: ICD-10-CM

## 2023-03-08 DIAGNOSIS — M79.672 PAIN IN BOTH FEET: ICD-10-CM

## 2023-03-08 LAB — ETHANOL EXG-MCNC: 0 MG/DL

## 2023-03-08 SDOH — ECONOMIC STABILITY - HOUSING INSECURITY: HOMELESSNESS UNSPECIFIED: Z59.00

## 2023-03-08 NOTE — ED PROVIDER NOTES
History  Chief Complaint   Patient presents with   • Foot Pain     Pt presents w/ b/l feet pain, burning, itching, discoloration  Pt states she is recently homeless and is concerned about frost bite  Pt also requesting crisis consult d/t increase in depression     69-year-old female with history of alcoholism, anxiety, depression, high blood pressure, hyperlipidemia who presents for evaluation of bilateral foot pain and depression  Patient reports that she recently became homeless as of approximately 4 weeks ago after losing her job  She reports that over those 4 weeks, she has developed a burning and itching sensation to the bottom of her bilateral feet  She does note that she is wearing socks and shoes essentially 24/7  She has been able to shower occasionally but puts oxygen is back on immediately after  She has not noted any numbness in her feet  She has not been taking any medications for the symptoms  She also notes that her toenails have been discolored  Patient also endorses that her depression and anxiety have been worsening since being homeless as she had to put her pets in an animal shelter  She has had fleeting thoughts of suicidality but has never developed a plan  She does not feel as though she would go through with harming herself  She is currently trying to get a job  Prior to Admission Medications   Prescriptions Last Dose Informant Patient Reported? Taking?    ALPRAZolam (XANAX) 1 mg tablet   No Yes   Sig: TAKE 1 TABLET BY MOUTH TWO TIMES A DAY AS NEEDED FOR ANXIETY   albuterol (PROVENTIL HFA,VENTOLIN HFA) 90 mcg/act inhaler   No No   Sig: Inhale 2 puffs every 6 (six) hours as needed for wheezing or shortness of breath   Patient not taking: Reported on 10/4/2022   metoprolol succinate (TOPROL-XL) 100 mg 24 hr tablet 3/8/2023  No Yes   Sig: take 1 tablet by mouth once daily      Facility-Administered Medications: None       Past Medical History:   Diagnosis Date   • Alcoholism [facility])    • Anxiety    • Depression    • Edema    • HBP (high blood pressure)    • Hyperlipidemia    • Insomnia    • Obesity    • Onychomycosis    • Psoriasis    • Tobacco user        Past Surgical History:   Procedure Laterality Date   • ESOPHAGOGASTRODUODENOSCOPY N/A 10/05/2012    w/ bx   • GASTROINTESTINAL SURGERY      due to GI bleed       Family History   Problem Relation Age of Onset   • No Known Problems Mother    • Heart attack Father    • Diabetes Father    • Hypertension Father    • Alcohol abuse Maternal Grandmother      I have reviewed and agree with the history as documented  E-Cigarette/Vaping   • E-Cigarette Use Never User      E-Cigarette/Vaping Substances   • Nicotine No    • THC No    • CBD No    • Flavoring No    • Other No    • Unknown No      Social History     Tobacco Use   • Smoking status: Every Day     Packs/day: 1 00     Types: Cigarettes   • Smokeless tobacco: Never   • Tobacco comments:     smoked since age 16   Vaping Use   • Vaping Use: Never used   Substance Use Topics   • Alcohol use: Yes     Comment: 10/4/22 - 6 Cans of beer a day - CK   • Drug use: Yes     Types: Marijuana       Review of Systems   Constitutional: Negative for chills and fever  Respiratory: Negative for shortness of breath  Cardiovascular: Negative for chest pain  Gastrointestinal: Negative for nausea and vomiting  Musculoskeletal: Negative for gait problem and joint swelling  Skin: Negative for rash and wound  Neurological: Negative for weakness and numbness  Psychiatric/Behavioral: Positive for suicidal ideas  Negative for self-injury  All other systems reviewed and are negative  Physical Exam  Physical Exam  Vitals and nursing note reviewed  Constitutional:       General: She is not in acute distress  Appearance: She is well-developed  She is not ill-appearing  HENT:      Head: Normocephalic and atraumatic        Nose: Nose normal       Mouth/Throat:      Mouth: Mucous membranes are moist    Eyes:      Conjunctiva/sclera: Conjunctivae normal    Cardiovascular:      Rate and Rhythm: Normal rate and regular rhythm  Heart sounds: No murmur heard  No friction rub  No gallop  Comments: 2+ DP pulses bilaterally  Pulmonary:      Effort: Pulmonary effort is normal       Breath sounds: Normal breath sounds  No wheezing, rhonchi or rales  Abdominal:      General: There is no distension  Palpations: Abdomen is soft  Tenderness: There is no abdominal tenderness  Musculoskeletal:         General: No swelling or tenderness  Normal range of motion  Cervical back: Normal range of motion and neck supple  Skin:     General: Skin is warm and dry  Coloration: Skin is not pale  Findings: No rash  Comments: Onychomycosis noted to toenails bilaterally  There is mild generalized erythema to the plantar aspects of bilateral feet  The areas are not warm or tender  There are no wounds noted  Neurological:      General: No focal deficit present  Mental Status: She is alert and oriented to person, place, and time  Comments: Motor and sensation intact bilateral lower extremities     Psychiatric:         Behavior: Behavior normal          Vital Signs  ED Triage Vitals   Temperature Pulse Respirations Blood Pressure SpO2   03/08/23 1759 03/08/23 1754 03/08/23 1754 03/08/23 1800 03/08/23 1754   98 6 °F (37 °C) 95 18 (!) 174/87 99 %      Temp Source Heart Rate Source Patient Position - Orthostatic VS BP Location FiO2 (%)   03/08/23 1759 03/08/23 1754 -- -- --   Oral Monitor         Pain Score       --                  Vitals:    03/08/23 1754 03/08/23 1800   BP:  (!) 174/87   Pulse: 95          Visual Acuity      ED Medications  Medications - No data to display    Diagnostic Studies  Results Reviewed     Procedure Component Value Units Date/Time    POCT alcohol breath test [[id number]]  (Normal) Resulted: 03/08/23 1826    Lab Status: Final result Updated: 03/08/23 1826     EXTBreath Alcohol 0 000                 No orders to display              Procedures  Procedures         ED Course                               SBIRT 20yo+    Flowsheet Row Most Recent Value   SBIRT (23 yo +)    In order to provide better care to our patients, we are screening all of our patients for alcohol and drug use  Would it be okay to ask you these screening questions? No Filed at: 03/08/2023 1759                    Medical Decision Making  41-year-old female presenting for bilateral foot pain and crisis evaluation  Patient with notable onychomycosis on exam   No evidence of acute bacterial infection  Patient with adequate pulses, no evidence of acute arterial thrombosis  Suspect that patient's foot pain is secondary to continuous wearing of shoes  Patient evaluated by crisis  Provided with homeless shelter list and additional resources  No criteria for inpatient treatment, patient also does not wish to sign herself in  Advised follow-up with primary care, podiatry, social work  Return precautions discussed  Homelessness: acute illness or injury  Onychomycosis: acute illness or injury  Pain in both feet: acute illness or injury  Amount and/or Complexity of Data Reviewed  Labs: ordered  Risk  Diagnosis or treatment significantly limited by social determinants of health            Disposition  Final diagnoses:   Onychomycosis   Pain in both feet   Homelessness     Time reflects when diagnosis was documented in both MDM as applicable and the Disposition within this note     Time User Action Codes Description Comment    3/8/2023  6:40 PM Kartik Serna [B35 1] Onychomycosis     3/8/2023  6:40 PM [place] Maur[place] [W00 780,  M79 672] Pain in both feet     3/8/2023  6:41 PM Kartik Serna [J69 26] Homelessness       ED Disposition     ED Disposition   Discharge    Condition   Stable    Date/Time   Wed Mar 8, 2023  6:40 PM    Comment   Mitch Arteaga discharge to home/self care                Follow-up Information     Follow up With Specialties Details Why Contact Info    Mary Dowling MD Internal Medicine, Pediatrics In 2 days  [address]  [address] [address]  [phone]            Discharge Medication List as of 3/8/2023  6:43 PM      CONTINUE these medications which have NOT CHANGED    Details   albuterol (PROVENTIL HFA,VENTOLIN HFA) 90 mcg/act inhaler Inhale 2 puffs every 6 (six) hours as needed for wheezing or shortness of breath, Starting Mon 10/19/2020, Normal      ALPRAZolam (XANAX) 1 mg tablet TAKE 1 TABLET BY MOUTH TWO TIMES A DAY AS NEEDED FOR ANXIETY, Normal      metoprolol succinate (TOPROL-XL) 100 mg 24 hr tablet take 1 tablet by mouth once daily, Normal                 PDMP Review       Value Time User    PDMP Reviewed  Yes 11/30/2022 11:04 AM Mary Dowling MD          ED Provider  Electronically Signed by           Nelsy Jansen MD  03/08/23 9820

## 2023-03-08 NOTE — ED NOTES
Provided patient with shelter list, outpatient resources, and a case management referral to [facility]  Advised her to contact the Count includes the [facility] and [employer] tomorrow

## 2023-03-08 NOTE — ED NOTES
Discharge reviewed with pt  Pt verbalized understanding and has no further questions at this time  Pt ambulatory off unit with steady gait       Miguel Coleman  03/08/23 5067

## 2023-03-08 NOTE — DISCHARGE INSTRUCTIONS
Follow-up with podiatry as well as social work  Follow-up with the homeless shelters and other resources provided by crisis  Please return to the emergency department if you develop worsening symptoms, severe pain, numbness, thoughts of harming yourself or anyone else, or anything else concerning to you

## 2023-03-14 ENCOUNTER — PATIENT OUTREACH (OUTPATIENT)
Dept: FAMILY MEDICINE CLINIC | Facility: CLINIC | Age: 58
End: 2023-03-14

## 2023-03-14 NOTE — PROGRESS NOTES
SW reviewed chart after receiving referral to outreach due to homelessness        Pt was provided by crisis VIRAJ Tinajero, with shelter list, outpatient resources  and was referred to [facility]  Pt was advised to contact the county and speak with Tere Abel, on 3/8/23, per   ED notes in chart  SW called pt  There was no voice mail available after approx 10 rings  Will attempt to reach pt again

## 2023-03-15 ENCOUNTER — OFFICE VISIT (OUTPATIENT)
Dept: FAMILY MEDICINE CLINIC | Facility: CLINIC | Age: 58
End: 2023-03-15

## 2023-03-15 ENCOUNTER — TELEPHONE (OUTPATIENT)
Dept: FAMILY MEDICINE CLINIC | Facility: CLINIC | Age: 58
End: 2023-03-15

## 2023-03-15 VITALS
HEART RATE: 51 BPM | WEIGHT: 229.5 LBS | DIASTOLIC BLOOD PRESSURE: 82 MMHG | OXYGEN SATURATION: 99 % | SYSTOLIC BLOOD PRESSURE: 160 MMHG | BODY MASS INDEX: 33.99 KG/M2 | TEMPERATURE: 98.9 F | HEIGHT: 69 IN | RESPIRATION RATE: 16 BRPM

## 2023-03-15 DIAGNOSIS — L40.0 PLAQUE PSORIASIS: Primary | ICD-10-CM

## 2023-03-15 DIAGNOSIS — L40.0 PLAQUE PSORIASIS: ICD-10-CM

## 2023-03-15 DIAGNOSIS — F10.21 HISTORY OF ALCOHOLISM (HCC): ICD-10-CM

## 2023-03-15 DIAGNOSIS — R53.83 OTHER FATIGUE: ICD-10-CM

## 2023-03-15 DIAGNOSIS — R21 RASH: ICD-10-CM

## 2023-03-15 DIAGNOSIS — F33.2 SEVERE EPISODE OF RECURRENT MAJOR DEPRESSIVE DISORDER, WITHOUT PSYCHOTIC FEATURES (HCC): ICD-10-CM

## 2023-03-15 DIAGNOSIS — E78.5 HYPERLIPIDEMIA, UNSPECIFIED HYPERLIPIDEMIA TYPE: ICD-10-CM

## 2023-03-15 DIAGNOSIS — Z72.0 TOBACCO USE: ICD-10-CM

## 2023-03-15 DIAGNOSIS — F41.9 ANXIETY: ICD-10-CM

## 2023-03-15 DIAGNOSIS — B35.1 NAIL FUNGUS: ICD-10-CM

## 2023-03-15 DIAGNOSIS — I10 ESSENTIAL HYPERTENSION: Primary | ICD-10-CM

## 2023-03-15 RX ORDER — TERBINAFINE HYDROCHLORIDE 250 MG/1
250 TABLET ORAL DAILY
Qty: 30 TABLET | Refills: 3 | Status: SHIPPED | OUTPATIENT
Start: 2023-03-15 | End: 2023-04-14

## 2023-03-15 RX ORDER — FLUCONAZOLE 150 MG/1
150 TABLET ORAL ONCE
Qty: 1 TABLET | Refills: 0 | Status: SHIPPED | OUTPATIENT
Start: 2023-03-15 | End: 2023-03-15

## 2023-03-15 RX ORDER — NYSTATIN 100000 [USP'U]/G
POWDER TOPICAL 3 TIMES DAILY
Qty: 15 G | Refills: 5 | Status: SHIPPED | OUTPATIENT
Start: 2023-03-15

## 2023-03-15 RX ORDER — PREDNISONE 20 MG/1
TABLET ORAL
Qty: 18 TABLET | Refills: 0 | Status: SHIPPED | OUTPATIENT
Start: 2023-03-15

## 2023-03-15 RX ORDER — LISINOPRIL 10 MG/1
10 TABLET ORAL DAILY
Qty: 30 TABLET | Refills: 5 | Status: SHIPPED | OUTPATIENT
Start: 2023-03-15

## 2023-03-15 RX ORDER — CLOBETASOL PROPIONATE 0.46 MG/ML
SOLUTION TOPICAL 2 TIMES DAILY
Qty: 50 ML | Refills: 5 | Status: SHIPPED | OUTPATIENT
Start: 2023-03-15

## 2023-03-15 RX ORDER — TRIAMCINOLONE ACETONIDE 5 MG/G
CREAM TOPICAL 3 TIMES DAILY
Qty: 30 G | Refills: 5 | Status: SHIPPED | OUTPATIENT
Start: 2023-03-15

## 2023-03-15 NOTE — ASSESSMENT & PLAN NOTE
The patch on her head and above the buttocks look like a form of plaque psoriasis-explained that she may need a biopsy to definitively prove that-was referred to Dermatology and Rheumatology -will do a prednisone taper to see if it helps with itching and gave her creams too and a shampoo-we discussed that I should probably involve Rheumatology too and get a consult-labs ordered that she has to get done-Quantiferon was ordered as well in anticipation of some DMARD start  I also told her that some of the nail changes, although she may have fungus too,may be psoriatic changes

## 2023-03-15 NOTE — ASSESSMENT & PLAN NOTE
Was better upon retake but still high-on Toprol XL but still drinking a lot of caffeine, smoking and still drinking a fair bit-counseled on all of that-will go ahead and add lisinopril today needs to get labs done

## 2023-03-15 NOTE — PROGRESS NOTES
Assessment/Plan:         Problem List Items Addressed This Visit        Cardiovascular and Mediastinum    Essential hypertension - Primary     Was better upon retake but still high-on Toprol XL but still drinking a lot of caffeine, smoking and still drinking a fair bit-counseled on all of that-will go ahead and add lisinopril today needs to get labs done         Relevant Medications    lisinopril (ZESTRIL) 10 mg tablet    Other Relevant Orders    CBC and differential    Comprehensive metabolic panel       Musculoskeletal and Integument    Plaque psoriasis     The patch on her head and above the buttocks look like a form of plaque psoriasis-explained that she may need a biopsy to definitively prove that-was referred to Dermatology and Rheumatology -will do a prednisone taper to see if it helps with itching and gave her creams too and a shampoo-we discussed that I should probably involve Rheumatology too and get a consult-labs ordered that she has to get done-Quantiferon was ordered as well in anticipation of some DMARD start  I also told her that some of the nail changes, although she may have fungus too,may be psoriatic changes         Relevant Medications    lisinopril (ZESTRIL) 10 mg tablet    clobetasol (TEMOVATE) 0 05 % external solution    predniSONE 20 mg tablet    triamcinolone (KENALOG) 0 5 % cream    Other Relevant Orders    MARY KATE Screen w/ Reflex to Titer/Pattern    Sedimentation rate, automated    RF Screen w/ Reflex to Titer    Quantiferon TB Gold Plus    Ambulatory Referral to Dermatology    Ambulatory Referral to Rheumatology       Other    Hyperlipidemia     Needs to get lipid panel         Relevant Orders    Lipid panel    Tobacco use    History of alcoholism ([facility] [address] )    Anxiety    Major depressive disorder   Other Visit Diagnoses     Other fatigue        Relevant Orders    TSH, 3rd generation    Nail fungus        Relevant Medications    terbinafine (LamISIL) 250 mg tablet            Subjective: Patient ID: Raoul Jaime is a 65 y o  female  Norlene Sor here today to discuss her fingernails possibly having fungus, and a scaly very large itchy patch on the back of her head-she has a hx of tobacco use, alcohol use (still drinks 1 or 2 40s on several days of the week, poorly controlled HTN, anxiety, HL-says the nails on her fingers and toes have looked bad for awhile  Has pretty elevated bp in the office today,      The following portions of the patient's history were reviewed and updated as appropriate:   Past Medical History:  She has a past medical history of Alcoholism ([address] ), Anxiety, Depression, Edema, HBP (high blood pressure), Hyperlipidemia, Insomnia, Obesity, Onychomycosis, Psoriasis, and Tobacco user  ,  _______________________________________________________________________  Medical Problems:  does not have any pertinent problems on file ,  _______________________________________________________________________  Past Surgical History:   has a past surgical history that includes Gastrointestinal Surgery and Esophagogastroduodenoscopy (N/A, 10/05/2012)  ,  _______________________________________________________________________  Family History:  family history includes Alcohol abuse in her maternal grandmother; Diabetes in her father; Heart attack in her father; Hypertension in her father; No Known Problems in her mother ,  _______________________________________________________________________  Social History:   reports that she has been smoking cigarettes  She has been smoking an average of 1 pack per day  She has never used smokeless tobacco  She reports current alcohol use  She reports current drug use  Drug: Marijuana  ,  _______________________________________________________________________  Allergies:  is allergic to bupropion     _______________________________________________________________________  Current Outpatient Medications   Medication Sig Dispense Refill   • ALPRAZolam (XANAX) 1 mg tablet TAKE 1 TABLET BY MOUTH TWO TIMES A DAY AS NEEDED FOR ANXIETY 60 tablet 0   • clobetasol (TEMOVATE) 0 05 % external solution Apply topically 2 (two) times a day 50 mL 5   • lisinopril (ZESTRIL) 10 mg tablet Take 1 tablet (10 mg total) by mouth daily 30 tablet 5   • metoprolol succinate (TOPROL-XL) 100 mg 24 hr tablet take 1 tablet by mouth once daily 30 tablet 3   • predniSONE 20 mg tablet Take 2 tablets daily for 4 days,1 tablet daily for 5 days, and then 1/2 tablet daily for 5 days 18 tablet 0   • terbinafine (LamISIL) 250 mg tablet Take 1 tablet (250 mg total) by mouth daily 30 tablet 3   • triamcinolone (KENALOG) 0 5 % cream Apply topically 3 (three) times a day 30 g 5     No current facility-administered medications for this visit      _______________________________________________________________________  Review of Systems   Constitutional: Negative  HENT: Negative  Respiratory: Negative  Cardiovascular: Negative  Skin: Positive for rash  Neurological: Negative  Hematological: Negative  Psychiatric/Behavioral: Negative  Objective:  Vitals:    03/15/23 1122 03/15/23 1202   BP: (!) 172/100 160/82   BP Location: Left arm    Patient Position: Sitting    Cuff Size: Large    Pulse: (!) 51    Resp: 16    Temp: 98 9 °F (37 2 °C)    TempSrc: Tympanic    SpO2: 99%    Weight: 104 kg (229 lb 8 oz)    Height: 5' 9" (1 753 m)      Body mass index is 33 89 kg/m²  Physical Exam  Constitutional:       Appearance: She is obese  HENT:      Head: Normocephalic and atraumatic  Comments: Large area on back of head looks like a plaque with scale on it     Right Ear: External ear normal       Left Ear: External ear normal       Nose: Nose normal       Mouth/Throat:      Mouth: Mucous membranes are moist    Eyes:      Extraocular Movements: Extraocular movements intact  Cardiovascular:      Rate and Rhythm: Normal rate and regular rhythm     Pulmonary:      Effort: Pulmonary effort is normal       Breath sounds: Normal breath sounds  Musculoskeletal:         General: Normal range of motion  Cervical back: Normal range of motion and neck supple  Skin:     Findings: Rash present  Comments: Scalp rash, raised scaly rash top part of buttocks area, fingernails discolored and plaqued, also has bright red rash under breasts   Neurological:      Mental Status: She is alert and oriented to person, place, and time

## 2023-03-23 ENCOUNTER — TELEPHONE (OUTPATIENT)
Dept: FAMILY MEDICINE CLINIC | Facility: CLINIC | Age: 58
End: 2023-03-23

## 2023-03-23 DIAGNOSIS — I10 ESSENTIAL HYPERTENSION: ICD-10-CM

## 2023-03-23 DIAGNOSIS — F41.9 ANXIETY: ICD-10-CM

## 2023-03-23 RX ORDER — METOPROLOL SUCCINATE 100 MG/1
TABLET, EXTENDED RELEASE ORAL
Qty: 90 TABLET | Refills: 1 | Status: SHIPPED | OUTPATIENT
Start: 2023-03-23

## 2023-03-23 RX ORDER — ALPRAZOLAM 1 MG/1
TABLET ORAL
Qty: 60 TABLET | Refills: 2 | Status: SHIPPED | OUTPATIENT
Start: 2023-03-23

## 2023-03-23 NOTE — TELEPHONE ENCOUNTER
Carlin Couch changed pharmacies and needs refills:  Metoprolol Succinate 100mg 1 daily  Alprazolam 1mg, 1 2xday as needed    [facility]

## 2023-03-24 ENCOUNTER — HOSPITAL ENCOUNTER (EMERGENCY)
Facility: HOSPITAL | Age: 58
Discharge: HOME | End: 2023-03-25
Attending: EMERGENCY MEDICINE

## 2023-03-24 DIAGNOSIS — F32.A DEPRESSION: Primary | ICD-10-CM

## 2023-03-24 DIAGNOSIS — R45.851 SUICIDAL IDEATIONS: ICD-10-CM

## 2023-03-24 LAB
ALBUMIN SERPL BCP-MCNC: 4.4 G/DL (ref 3.5–5)
ALP SERPL-CCNC: 50 U/L (ref 34–104)
ALT SERPL W P-5'-P-CCNC: 27 U/L (ref 7–52)
AMPHETAMINES SERPL QL SCN: NEGATIVE
ANION GAP SERPL CALCULATED.3IONS-SCNC: 14 MMOL/L (ref 4–13)
AST SERPL W P-5'-P-CCNC: 13 U/L (ref 13–39)
BARBITURATES UR QL: NEGATIVE
BASOPHILS # BLD MANUAL: 0.14 THOUSAND/UL (ref 0–0.1)
BASOPHILS NFR MAR MANUAL: 1 % (ref 0–1)
BENZODIAZ UR QL: NEGATIVE
BILIRUB SERPL-MCNC: 0.36 MG/DL (ref 0.2–1)
BUN SERPL-MCNC: 10 MG/DL (ref 5–25)
CALCIUM SERPL-MCNC: 9.4 MG/DL (ref 8.4–10.2)
CHLORIDE SERPL-SCNC: 107 MMOL/L (ref 96–108)
CO2 SERPL-SCNC: 23 MMOL/L (ref 21–32)
COCAINE UR QL: NEGATIVE
CREAT SERPL-MCNC: 0.67 MG/DL (ref 0.6–1.3)
EOSINOPHIL # BLD MANUAL: 0.14 THOUSAND/UL (ref 0–0.4)
EOSINOPHIL NFR BLD MANUAL: 1 % (ref 0–6)
ERYTHROCYTE [DISTWIDTH] IN BLOOD BY AUTOMATED COUNT: 12.9 % (ref 11.6–15.1)
ETHANOL EXG-MCNC: 0.09 MG/DL
GFR SERPL CREATININE-BSD FRML MDRD: 98 ML/MIN/1.73SQ M
GLUCOSE SERPL-MCNC: 82 MG/DL (ref 65–140)
HCT VFR BLD AUTO: 50.5 % (ref 34.8–46.1)
HGB BLD-MCNC: 16.6 G/DL (ref 11.5–15.4)
LG PLATELETS BLD QL SMEAR: PRESENT
LYMPHOCYTES # BLD AUTO: 33 % (ref 14–44)
LYMPHOCYTES # BLD AUTO: 4.61 THOUSAND/UL (ref 0.6–4.47)
MCH RBC QN AUTO: 31.9 PG (ref 26.8–34.3)
MCHC RBC AUTO-ENTMCNC: 32.9 G/DL (ref 31.4–37.4)
MCV RBC AUTO: 97 FL (ref 82–98)
METHADONE UR QL: NEGATIVE
MONOCYTES # BLD AUTO: 0.56 THOUSAND/UL (ref 0–1.22)
MONOCYTES NFR BLD: 4 % (ref 4–12)
NEUTROPHILS # BLD MANUAL: 8.53 THOUSAND/UL (ref 1.85–7.62)
NEUTS BAND NFR BLD MANUAL: 1 % (ref 0–8)
NEUTS SEG NFR BLD AUTO: 60 % (ref 43–75)
OPIATES UR QL SCN: NEGATIVE
OXYCODONE+OXYMORPHONE UR QL SCN: NEGATIVE
PCP UR QL: NEGATIVE
PLATELET # BLD AUTO: 215 THOUSANDS/UL (ref 149–390)
PLATELET BLD QL SMEAR: ADEQUATE
PMV BLD AUTO: 12.4 FL (ref 8.9–12.7)
POTASSIUM SERPL-SCNC: 3.6 MMOL/L (ref 3.5–5.3)
PROT SERPL-MCNC: 7.5 G/DL (ref 6.4–8.4)
RBC # BLD AUTO: 5.2 MILLION/UL (ref 3.81–5.12)
RBC MORPH BLD: NORMAL
SODIUM SERPL-SCNC: 144 MMOL/L (ref 135–147)
THC UR QL: POSITIVE
TSH SERPL DL<=0.05 MIU/L-ACNC: 1.82 UIU/ML (ref 0.45–4.5)
WBC # BLD AUTO: 13.98 THOUSAND/UL (ref 4.31–10.16)

## 2023-03-25 VITALS
TEMPERATURE: 98.3 F | OXYGEN SATURATION: 98 % | HEART RATE: 75 BPM | DIASTOLIC BLOOD PRESSURE: 75 MMHG | SYSTOLIC BLOOD PRESSURE: 164 MMHG | RESPIRATION RATE: 17 BRPM

## 2023-03-25 RX ORDER — NICOTINE 21 MG/24HR
21 PATCH, TRANSDERMAL 24 HOURS TRANSDERMAL ONCE
Status: DISCONTINUED | OUTPATIENT
Start: 2023-03-25 | End: 2023-03-25 | Stop reason: HOSPADM

## 2023-03-25 RX ORDER — ALPRAZOLAM 0.5 MG/1
1 TABLET ORAL 2 TIMES DAILY PRN
Status: DISCONTINUED | OUTPATIENT
Start: 2023-03-25 | End: 2023-03-25 | Stop reason: HOSPADM

## 2023-03-25 RX ORDER — METOPROLOL SUCCINATE 50 MG/1
100 TABLET, EXTENDED RELEASE ORAL DAILY
Status: DISCONTINUED | OUTPATIENT
Start: 2023-03-25 | End: 2023-03-25 | Stop reason: HOSPADM

## 2023-03-25 RX ORDER — LISINOPRIL 10 MG/1
10 TABLET ORAL DAILY
Status: DISCONTINUED | OUTPATIENT
Start: 2023-03-25 | End: 2023-03-25 | Stop reason: HOSPADM

## 2023-03-25 RX ADMIN — NICOTINE 21 MG: 21 PATCH, EXTENDED RELEASE TRANSDERMAL at 11:27

## 2023-03-25 RX ADMIN — METOPROLOL SUCCINATE 100 MG: 50 TABLET, EXTENDED RELEASE ORAL at 09:13

## 2023-03-25 RX ADMIN — LISINOPRIL 10 MG: 10 TABLET ORAL at 09:13

## 2023-03-25 NOTE — ED NOTES
Pt expressed needing a cigarette  Made pt aware, [facility] is a non smoking facility  Provider made aware for nicotine patch  Pt agreeable and cooperative at this time  Pt expressed not feeling SI at this time  Pt stated she did feel SI last night due to drinking and feeling sorry for herself  Crisis and provider made aware  Plan for psych consult         Mat Hilliard RN  03/25/23 8150

## 2023-03-25 NOTE — ED NOTES
"Met with patient to complete the crisis intake assessment and safety risk assessment  Patient voluntarily came to the ER endorsing SI  Patient lost her job within the last few months and is currently homeless  She recently came to the ER for a medical reason and received resources for shelters  Patient reported that she has not been able to get any help with her situation and is now feeling that she doesn't want to live anymore  She reported a plan to shoot herself in the head and noted that she can get hold of a gun if she wants to  Patient was visibly anxious and frustrated, stating that nobody wants to help her and she \"can't take it anymore\"  Patient was logical/coherent, oriented x4, maintained eye contact  She was tearful and expressed thoughts of hopelessness  Patient reported that she hasn't been eating or sleeping regularly  She reported that she is smoking THC and occasionally drinking  Patient has no behavioral health history  She denied HI, AVH, paranoia and endorsed depression and anxiety  Patient was agreeable to signing a 201 but was concerned that she would be viewed as \"crazy  \"  Patient was advised that people went to behavioral health units for different reasons which were not always related to psychosis  Patient expressed understanding  She signed the 201, and her rights were explained, served, and understood    "

## 2023-03-25 NOTE — ED NOTES
Pt observed calm sleeping in no distress   1:1 maintained     Luis Franco, [facility]  03/25/23 1650

## 2023-03-25 NOTE — ED CARE HANDOFF
Emergency Department Sign Out Note        Sign out and transfer of care from Dr Robin Pulido  See Separate Emergency Department note  The patient, Bharat Diaz, was evaluated by the previous provider for suicidal ideation, depression  Workup Completed:  Medically cleared for inpatient psych    ED Course / Workup Pending (followup):      ED Course as of 03/25/23 1629   Sat Mar 25, 2023   0759 SO: 65 YOF, depression, SI, 201 signed, pending placement   1111 Patient requesting to leave  Psych consult placed in attempt to clear patient given her SI on arrival which patient is agreeable to  Nicotine patch also ordered as patient is desiring to smoke  1618 Patient clear for discharge per Cheyenne Villanueva from psychiatry  Procedures  Medical Decision Making  79-year-old female previously evaluated for suicidal ideation and depression  Signed out to me with 201 in place, pending placement  While in the department, patient was subsequently requesting to leave and rescind her 201  Patient stating she is no longer suicidal   Patient was evaluated by psychiatry who felt that she was stable for discharge as she was no longer suicidal at this time  Patient left the emergency department prior to receiving discharge paperwork  Depression: acute illness or injury  Suicidal ideations: acute illness or injury  Amount and/or Complexity of Data Reviewed  Labs: ordered  Risk  OTC drugs  Prescription drug management  Decision regarding hospitalization  Disposition  Final diagnoses:   Depression   Suicidal ideations     Time reflects when diagnosis was documented in both MDM as applicable and the Disposition within this note     Time User Action Codes Description Comment    3/25/2023  1:06 AM Markos Merchant Add [J10  A] Depression     3/25/2023 11:08 AM José Hollingsworth Add [R45 851] Suicidal ideations       ED Disposition     ED Disposition   Discharge    Condition   Stable    Date/Time   Sat Mar 25, 2023  4:18 PM    Comment   Ignacio Ortiz should be transferred out to behavioral [facility] and has been medically cleared  MD Documentation    [address] [facility] Most Recent Value   Sending MD Dr Melody Hernandez    None       Discharge Medication List as of 3/25/2023  4:28 PM      CONTINUE these medications which have NOT CHANGED    Details   ALPRAZolam (XANAX) 1 mg tablet TAKE 1 TABLET BY MOUTH TWO TIMES A DAY AS NEEDED FOR ANXIETY, Normal      clobetasol (TEMOVATE) 0 05 % external solution Apply topically 2 (two) times a day, Starting Wed 3/15/2023, Normal      lisinopril (ZESTRIL) 10 mg tablet Take 1 tablet (10 mg total) by mouth daily, Starting Wed 3/15/2023, Normal      metoprolol succinate (TOPROL-XL) 100 mg 24 hr tablet take 1 tablet by mouth once daily, Normal      nystatin (MYCOSTATIN) powder Apply topically 3 (three) times a day, Starting Wed 3/15/2023, Normal      predniSONE 20 mg tablet Take 2 tablets daily for 4 days,1 tablet daily for 5 days, and then 1/2 tablet daily for 5 days, Normal      terbinafine (LamISIL) 250 mg tablet Take 1 tablet (250 mg total) by mouth daily, Starting Wed 3/15/2023, Until Fri 4/14/2023, Normal      triamcinolone (KENALOG) 0 5 % cream Apply topically 3 (three) times a day, Starting Wed 3/15/2023, Normal           No discharge procedures on file         ED Provider  Electronically Signed by     Ki Contreras MD  03/25/23 8033

## 2023-03-25 NOTE — ED NOTES
Mary called at this time for psych consult  Awaiting psychiatric callback       Yoselyn Siddiqui RN  03/25/23 2269

## 2023-03-25 NOTE — ED PROVIDER NOTES
"History  Chief Complaint   Patient presents with   • Psychiatric Evaluation     Pt presents to the ED with c/o depression, homelessness, and SI     45-year-old female comes in for psychiatric evaluation  Patient states that she is severely depressed and had thoughts about \"not wanting to be here anymore\"  Patient states she recently dropped her job and then because she did not have any money she could not stay in her home  Patient has now been homeless for several weeks and cannot find a shelter to stay in  She has tried multiple shelters and has been looking for a job but cannot find any help  Patient tearful on arriving to the emergency department      History provided by:  Patient   used: No    Psychiatric Evaluation  Presenting symptoms: depression and suicidal thoughts    Presenting symptoms: no agitation    Degree of incapacity (severity):  Severe  Onset quality:  Gradual  Timing:  Constant  Progression:  Worsening  Chronicity:  New  Context: drug abuse and stressful life event    Treatment compliance:  Most of the time  Ineffective treatments:  Anti-anxiety medications  Associated symptoms: anxiety    Associated symptoms: no abdominal pain, no chest pain, no fatigue and no headaches    Risk factors: hx of mental illness        Prior to Admission Medications   Prescriptions Last Dose Informant Patient Reported? Taking?    ALPRAZolam (XANAX) 1 mg tablet   No No   Sig: TAKE 1 TABLET BY MOUTH TWO TIMES A DAY AS NEEDED FOR ANXIETY   clobetasol (TEMOVATE) 0 05 % external solution   No No   Sig: Apply topically 2 (two) times a day   lisinopril (ZESTRIL) 10 mg tablet   No No   Sig: Take 1 tablet (10 mg total) by mouth daily   metoprolol succinate (TOPROL-XL) 100 mg 24 hr tablet   No No   Sig: take 1 tablet by mouth once daily   nystatin (MYCOSTATIN) powder   No No   Sig: Apply topically 3 (three) times a day   predniSONE 20 mg tablet   No No   Sig: Take 2 tablets daily for 4 days,1 tablet " daily for 5 days, and then 1/2 tablet daily for 5 days   terbinafine (LamISIL) 250 mg tablet   No No   Sig: Take 1 tablet (250 mg total) by mouth daily   triamcinolone (KENALOG) 0 5 % cream   No No   Sig: Apply topically 3 (three) times a day      Facility-Administered Medications: None       Past Medical History:   Diagnosis Date   • Alcoholism ([facility] [address] )    • Anxiety    • Depression    • Edema    • HBP (high blood pressure)    • Hyperlipidemia    • Insomnia    • Obesity    • Onychomycosis    • Psoriasis    • Tobacco user        Past Surgical History:   Procedure Laterality Date   • ESOPHAGOGASTRODUODENOSCOPY N/A 10/05/2012    w/ bx   • GASTROINTESTINAL SURGERY      due to GI bleed       Family History   Problem Relation Age of Onset   • No Known Problems Mother    • Heart attack Father    • Diabetes Father    • Hypertension Father    • Alcohol abuse Maternal Grandmother      I have reviewed and agree with the history as documented  E-Cigarette/Vaping   • E-Cigarette Use Never User      E-Cigarette/Vaping Substances   • Nicotine No    • THC No    • CBD No    • Flavoring No    • Other No    • Unknown No      Social History     Tobacco Use   • Smoking status: Every Day     Packs/day: 1 00     Types: Cigarettes   • Smokeless tobacco: Never   • Tobacco comments:     smoked since age 16   Vaping Use   • Vaping Use: Never used   Substance Use Topics   • Alcohol use: Yes   • Drug use: Yes     Types: Marijuana       Review of Systems   Constitutional: Negative for fatigue and fever  HENT: Negative for congestion and ear pain  Eyes: Negative for discharge and redness  Respiratory: Negative for apnea, cough, shortness of breath and wheezing  Cardiovascular: Negative for chest pain  Gastrointestinal: Negative for abdominal pain and diarrhea  Endocrine: Negative for cold intolerance and polydipsia  Genitourinary: Negative for difficulty urinating and hematuria     Musculoskeletal: Negative for arthralgias and back pain    Skin: Negative for color change and rash  Allergic/Immunologic: Negative for environmental allergies and immunocompromised state  Neurological: Negative for numbness and headaches  Hematological: Negative for adenopathy  Does not bruise/bleed easily  Psychiatric/Behavioral: Positive for suicidal ideas  Negative for agitation and behavioral problems  The patient is nervous/anxious  Physical Exam  Physical Exam  Vitals and nursing note reviewed  Constitutional:       Appearance: Normal appearance  She is well-developed  She is not toxic-appearing  HENT:      Head: Normocephalic and atraumatic  Right Ear: Tympanic membrane and external ear normal       Left Ear: Tympanic membrane and external ear normal       Nose: Nose normal  No nasal deformity or rhinorrhea  Mouth/Throat:      Dentition: Normal dentition  Pharynx: Uvula midline  Eyes:      General: Lids are normal          Right eye: No discharge  Left eye: No discharge  Conjunctiva/sclera: Conjunctivae normal       Pupils: Pupils are equal, round, and reactive to light  Neck:      Vascular: No carotid bruit or JVD  Trachea: Trachea normal    Cardiovascular:      Rate and Rhythm: Normal rate and regular rhythm  No extrasystoles are present  Chest Wall: PMI is not displaced  Pulses: Normal pulses  Pulmonary:      Effort: Pulmonary effort is normal  No accessory muscle usage or respiratory distress  Breath sounds: Normal breath sounds  No wheezing, rhonchi or rales  Abdominal:      General: Bowel sounds are normal       Palpations: Abdomen is soft  Abdomen is not rigid  There is no mass  Tenderness: There is no abdominal tenderness  There is no guarding or rebound  Musculoskeletal:      Right shoulder: No swelling, deformity or bony tenderness  Normal range of motion  Cervical back: Normal range of motion and neck supple  No deformity, tenderness or bony tenderness  Lymphadenopathy:      Cervical: No cervical adenopathy  Skin:     General: Skin is warm and dry  Findings: No rash  Neurological:      Mental Status: She is alert and oriented to person, place, and time  GCS: GCS eye subscore is 4  GCS verbal subscore is 5  GCS motor subscore is 6  Cranial Nerves: No cranial nerve deficit  Sensory: No sensory deficit  Deep Tendon Reflexes: Reflexes are normal and symmetric  Psychiatric:         Mood and Affect: Mood is depressed  Affect is tearful  Behavior: Behavior is withdrawn  Behavior is cooperative  Thought Content: Thought content includes suicidal ideation           Vital Signs  ED Triage Vitals [03/24/23 2129]   Temperature Pulse Respirations Blood Pressure SpO2   97 5 °F (36 4 °C) 74 16 (!) 172/68 95 %      Temp Source Heart Rate Source Patient Position - Orthostatic VS BP Location FiO2 (%)   Oral Monitor Sitting Left arm --      Pain Score       No Pain           Vitals:    03/24/23 2129 03/24/23 2235 03/25/23 0120 03/25/23 0909   BP: (!) 172/68 (!) 172/68 (!) 186/99 164/75   Pulse: 74 70 67 75   Patient Position - Orthostatic VS: Sitting  Sitting Lying         Visual Acuity      ED Medications  Medications   lisinopril (ZESTRIL) tablet 10 mg (10 mg Oral Given 3/25/23 0913)   ALPRAZolam (XANAX) tablet 1 mg (has no administration in time range)   metoprolol succinate (TOPROL-XL) 24 hr tablet 100 mg (100 mg Oral Given 3/25/23 0913)   nicotine (NICODERM CQ) 21 mg/24 hr TD 24 hr patch 21 mg (21 mg Transdermal Medication Applied 3/25/23 1127)       Diagnostic Studies  Results Reviewed     Procedure Component Value Units Date/Time    CBC and differential [[id number]]  (Abnormal) Collected: 03/24/23 2224    Lab Status: Final result Specimen: Blood from Hand, Left Updated: 03/24/23 2313     WBC 13 98 Thousand/uL      RBC 5 20 Million/uL      Hemoglobin 16 6 g/dL      Hematocrit 50 5 %      MCV 97 fL      MCH 31 9 pg      MCHC 32 9 g/dL      RDW 12 9 %      MPV 12 4 fL      Platelets 578 Thousands/uL     Narrative: This is an appended report  These results have been appended to a previously verified report  Manual Differential(PHLEBS Do Not Order) [[id number]]  (Abnormal) Collected: 03/24/23 2224    Lab Status: Final result Specimen: Blood from Hand, Left Updated: 03/24/23 2313     Segmented % 60 %      Bands % 1 %      Lymphocytes % 33 %      Monocytes % 4 %      Eosinophils, % 1 %      Basophils % 1 %      Absolute Neutrophils 8 53 Thousand/uL      Lymphocytes Absolute 4 61 Thousand/uL      Monocytes Absolute 0 56 Thousand/uL      Eosinophils Absolute 0 14 Thousand/uL      Basophils Absolute 0 14 Thousand/uL      Total Counted --     RBC Morphology Normal     Platelet Estimate Adequate     Large Platelet Present    TSH [[id number]]  (Normal) Collected: 03/24/23 2224    Lab Status: Final result Specimen: Blood from Hand, Left Updated: 03/24/23 2304     TSH 3RD GENERATON 1 825 uIU/mL     Rapid drug screen, urine [[id number]]  (Abnormal) Collected: 03/24/23 2227    Lab Status: Final result Specimen: Urine, Clean Catch Updated: 03/24/23 2252     Amph/Meth UR Negative     Barbiturate Ur Negative     Benzodiazepine Urine Negative     Cocaine Urine Negative     Methadone Urine Negative     Opiate Urine Negative     PCP Ur Negative     THC Urine Positive     Oxycodone Urine Negative    Narrative:      Presumptive report  If requested, specimen will be sent to reference lab for confirmation  FOR MEDICAL PURPOSES ONLY  IF CONFIRMATION NEEDED PLEASE CONTACT THE LAB WITHIN 5 DAYS      Drug Screen Cutoff Levels:  AMPHETAMINE/METHAMPHETAMINES  1000 ng/mL  BARBITURATES     200 ng/mL  BENZODIAZEPINES     200 ng/mL  COCAINE      300 ng/mL  METHADONE      300 ng/mL  OPIATES      300 ng/mL  PHENCYCLIDINE     25 ng/mL  THC       50 ng/mL  OXYCODONE      100 ng/mL    Comprehensive metabolic panel [[id number]]  (Abnormal) Collected: 03/24/23 2224    Lab Status: Final result Specimen: Blood from Hand, Left Updated: 03/24/23 2251     Sodium 144 mmol/L      Potassium 3 6 mmol/L      Chloride 107 mmol/L      CO2 23 mmol/L      ANION GAP 14 mmol/L      BUN 10 mg/dL      Creatinine 0 67 mg/dL      Glucose 82 mg/dL      Calcium 9 4 mg/dL      AST 13 U/L      ALT 27 U/L      Alkaline Phosphatase 50 U/L      Total Protein 7 5 g/dL      Albumin 4 4 g/dL      Total Bilirubin 0 36 mg/dL      eGFR 98 ml/min/1 73sq m     Narrative:      National Kid[employer] Disease Foundation guidelines for Chronic Kidney Disease (CKD):   •  Stage 1 with normal or high GFR (GFR > 90 mL/min/1 73 square meters)  •  Stage 2 Mild CKD (GFR = 60-89 mL/min/1 73 square meters)  •  Stage 3A Moderate CKD (GFR = 45-59 mL/min/1 73 square meters)  •  Stage 3B Moderate CKD (GFR = 30-44 mL/min/1 73 square meters)  •  Stage 4 Severe CKD (GFR = 15-29 mL/min/1 73 square meters)  •  Stage 5 End Stage CKD (GFR <15 mL/min/1 73 square meters)  Note: GFR calculation is accurate only with a steady state creatinine    POCT alcohol breath test [[id number]]  (Normal) Resulted: 03/24/23 2208    Lab Status: Final result Updated: 03/24/23 2208     EXTBreath Alcohol 0 087                 No orders to display              Procedures  Procedures         ED Course                               SBIRT 22yo+    Flowsheet Row Most Recent Value   SBIRT (25 yo +)    In order to provide better care to our patients, we are screening all of our patients for alcohol and drug use  Would it be okay to ask you these screening questions? Yes Filed at: 03/24/2023 2236   Initial Alcohol Screen: US AUDIT-C     1  How often do you have a drink containing alcohol? 3 Filed at: 03/24/2023 2236   2  How many drinks containing alcohol do you have on a typical day you are drinking? 4 Filed at: 03/24/2023 2236   3b  FEMALE Any Age, or MALE 65+: How often do you have 4 or more drinks on one occassion?  4 Filed at: 03/24/2023 2236   Audit-C Score 11 Filed at: "03/24/2023 2236   JON: How many times in the past year have you    Used an illegal drug or used a prescription medication for non-medical reasons? Daily or Almost Daily Filed at: 03/24/2023 2236   DAST-10: In the past 12 months       1  Have you used drugs other than those required for medical reasons? 0 Filed at: 03/24/2023 2236   2  Do you use more than one drug at a time? 0 Filed at: 03/24/2023 2236   3  Have you had medical problems as a result of your drug use (e g , memory loss, hepatitis, convulsions, bleeding, etc )? 0 Filed at: 03/24/2023 2236   4  Have you had \"blackouts\" or \"flashbacks\" as a result of drug use? YesNo 0 Filed at: 03/24/2023 2236   5  Do you ever feel bad or guilty about your drug use? 1 Filed at: 03/24/2023 2236   6  Does your spouse (or parent) ever complain about your involvement with drugs? 0 Filed at: 03/24/2023 2236   7  Have you neglected your family because of your use of drugs? 0 Filed at: 03/24/2023 2236   8  Have you engaged in illegal activities in order to obtain drugs? 0 Filed at: 03/24/2023 2236   9  Have you ever experienced withdrawal symptoms (felt sick) when you stopped taking drugs? 0 Filed at: 03/24/2023 2236   10  Are you always able to stop using drugs when you want to? 0 Filed at: 03/24/2023 2236   DAST-10 Score 1 Filed at: 03/24/2023 2236                    Medical Decision Making  Differential diagnosis includes but is not limited to suicidal thoughts, suicidal ideation, depression, anxiety, polypharmacy abuse    Depression: acute illness or injury  Suicidal ideations: acute illness or injury  Amount and/or Complexity of Data Reviewed  External Data Reviewed: notes  Details: Reviewed notes patient has been seen for this in the past she was given resources however despite doing her best she has not been able to get the help that she needs an outpatient follow-up  Labs: ordered  Decision-making details documented in ED Course    ECG/medicine tests: ordered and " independent interpretation performed  Decision-making details documented in ED Course  Risk  OTC drugs  Prescription drug management  Decision regarding hospitalization  Diagnosis or treatment significantly limited by social determinants of health  Risk Details: Discussed case with crisis worker they feel patient is appropriate for inpatient care patient signed 201        Disposition  Final diagnoses:   Depression   Suicidal ideations     Time reflects when diagnosis was documented in both MDM as applicable and the Disposition within this note     Time User Action Codes Description Comment    3/25/2023  1:06 AM Dalton Phillips Add [P13  A] Depression     3/25/2023 11:08 AM Samia Lyon Add [R45 851] Suicidal ideations       ED Disposition     ED Disposition   Transfer to [address] [place] [address]   --    Date/Time   Fri Mar 24, 2023 11:16 PM    Comment   Ignacio Ortiz should be transferred out to behavioral health and has been medically cleared  MD Documentation    Buffy Moore Most Recent Value   Sending MD Dr Melody Hernandez    None         Patient's Medications   Discharge Prescriptions    No medications on file       No discharge procedures on file      PDMP Review       Value Time User    PDMP Reviewed  Yes 11/30/2022 11:04 AM Lasha Hoskins MD          ED Provider  Electronically Signed by           Dominga Bloch, DO  03/25/23 [facility] , DO  03/25/23 1535

## 2023-03-25 NOTE — CONSULTS
TeleConsultation - [id number] R Annamarie 65 y o  female MRN: [id number]  Unit/Bed#: ED 03 Encounter: [id number]        REQUIRED DOCUMENTATION:     1  This service was provided via Telemedicine  2  Provider located at [place]   3  TeleMed provider: Yanira Hill MD   4  Identify all parties in room with patient during tele consult:  Patient  5  Patient was then informed that this was a Telemedicine visit and that the exam was being conducted confidentially over secure lines  My office door was closed  No one else was in the room  Patient acknowledged consent and understanding of privacy and security of the Telemedicine visit, and gave us permission to have the assistant stay in the room in order to assist with the history and to conduct the exam   I informed the patient that I have reviewed their record in Epic and presented the opportunity for them to ask any questions regarding the visit today  The patient agreed to participate  Assessment/Plan     Active Problems:    * No active hospital problems  *        Assessment:  -Unspecified depressive d/o, r/o Substance induced mood d/o (Alcohol and cannabis) vs Adjustment d/o  -Anxiety d/o  -Alcohol use d/o  -Homelessness        Recommendations & Treatment Plan:    -Patient initially reported SI with plan in context of intoxication (BAL 0 087 upon presentation) after drinking 8 drinks all day (higher than her usual 3 drinks daily)  Patient current adamantly denies any suicidal ideation, intent, or plan, and states that she indeed has supportive friends to reach out to, she is future oriented with plans to go back to shelter, then look for another job so that she can afford housing  She also reports interest in outpatient psychiatry (please provide referrals) and alcohol detox programs  She plans to do AA meetings for now   Patient now denies having access to guns, states that she exaggerated, where actually she is no longer in touch with the friend "who used to have a gun  Patient has been observed to be calm, cooperative, forthcoming, pleasant, smiling at times, eating, drinking, and sleeping normally  Patient reports she takes Xanax 1mg bid and has sufficient supply, she understands not to combine with ETOH  Patient rescinds 201 and declines inpatient admission, does not present with imminent danger to self/others and may be psychiatrically cleared upon medical clearance  SW to assist with outpatient [address] referrals, shelter assistance, and ED return precautions/suicide hotline number  Current Medications:   Current Facility-Administered Medications   Medication Dose Route Frequency Provider Last Rate   • ALPRAZolam  1 mg Oral BID PRN Matilde Domingo MD     • lisinopril  10 mg Oral Daily Matilde Domingo MD     • metoprolol succinate  100 mg Oral Daily Matilde Domingo MD     • nicotine  21 mg Transdermal Once Ki Contreras MD         Risks / Benefits of Treatment:  Risks, benefits, and possible side effects of medications explained to patient and patient verbalizes understanding  Consults  Physician Requesting Consult: Ki Contreras MD  Principal Problem:<principal problem not specified>    Reason for Consult: suicidal ideation    HPI:  Initial crisis note:  \"Met with patient to complete the crisis intake assessment and safety risk assessment  Patient voluntarily came to the ER endorsing SI  Patient lost her job within the last few months and is currently homeless  She recently came to the ER for a medical reason and received resources for shelters  Patient reported that she has not been able to get any help with her situation and is now feeling that she doesn't want to live anymore  She reported a plan to shoot herself in the head and noted that she can get hold of a gun if she wants to  Patient was visibly anxious and frustrated, stating that nobody wants to help her and she \"can't take it anymore\"    Patient was " "logical/coherent, oriented x4, maintained eye contact  She was tearful and expressed thoughts of hopelessness  Patient reported that she hasn't been eating or sleeping regularly  She reported that she is smoking THC and occasionally drinking  Patient has no behavioral health history  She denied HI, AVH, paranoia and endorsed depression and anxiety  Patient was agreeable to signing a 201 but was concerned that she would be viewed as \"crazy  \"  Patient was advised that people went to behavioral health units for different reasons which were not always related to psychosis  Patient expressed understanding  She signed the 201, and her rights were explained, served, and understood  \"    Krystalbandar De Leon 3/25/23  Patient seen via telepsychiatry  Chart reviewed and discussed with staff, who report patient improved in affect/behavior, brighter, eating/drinking, conversing, slept well overnight  Patient was cooperative and forthcoming during interview  States \"I admit I said some things about ending my life because I was having a hard time, and I had too many drinks yesterday  I usually drink 3 a day, ysterday I had 8 and it brought me down  I feel much better after sleeping last night, I would never kill myself or hurt anyone, I want to live and get a job, and get back my apartment and move on with life  I want to go back to [address], I relapsed 2 years ago and I realize this made things worse for me  Everybody has a hard time sometimes, I did too, but I don't want to kill myself  \" Patient states she is [employer] and believes suicide is not allowed religiously  She states she takes Xanax PRN 1mg bid and plans to continue, but is open to talking to a psychiatrist outpatient as well as therapy  She denies history of suicide attempts or inpatient hospitalizations  Patient admits to worsened mood due to loss of job and apartment 3 months ago  Due to cannabis and alcohol use, her sleep occasionally poor  Appetite fair   Denies " "anhedonia, psychomotor changes, hopeless/worthless/guilty feelings  Denies manic and psychotic symptoms     Psychiatric Review Of Systems:  Negative except as reported or endorsed in HPI    Historical Information     Past Psychiatric History:     Psychiatric Hospitalizations:   • No history of past inpatient psychiatric admissions  Outpatient Treatment History:   • sees PCP for anxiety  Suicide Attempts:   • None  History of self-harm:   • denies  Violence History:   • denies  Current Psychotropic regimen:xanax 1mg bid prn  Past Psychiatric medication trials: denies    Substance Abuse History:  See hpi, 3 drinks daily, marijuana daily, cigarettes daily, denies drugs    Family Psychiatric History:    Denies    Social History:  Plans to return to [employer], then plans to go look for employment  Plans to find a job in [employer], or [employer], \"basically anything that's available at this point  \"  Never   No children  Support system- states she has friends who can help her out               Past Medical History:   Diagnosis Date   • Alcoholism ([facility] )    • Anxiety    • Depression    • Edema    • HBP (high blood pressure)    • Hyperlipidemia    • Insomnia    • Obesity    • Onychomycosis    • Psoriasis    • Tobacco user        Medical Review Of Systems:    Review of Systems    Meds/Allergies     all current active meds have been reviewed  Allergies   Allergen Reactions   • Bupropion Other (See Comments)     DID NOT TOLERATE MEDICATION \"COULD NOT FUNCTION\"       Objective     Vital signs in last 24 hours:  Temp:  [97 5 °F (36 4 °C)-98 3 °F (36 8 °C)] 98 3 °F (36 8 °C)  HR:  [67-75] 75  Resp:  [16-20] 17  BP: (164-186)/(68-99) 164/75    No intake or output data in the 24 hours ending 03/25/23 1434    Mental Status Evaluation:    Appearance:  older than stated age and piercings   Behavior:  normal   Speech:  normal volume   Mood:  normal   Affect:  constricted   Language: naming objects   Thought " Process:  normal   Associations intact associations   Thought Content:  normal   Perceptual Disturbances: None   Risk Potential: Suicidal Ideations none  Homicidal Ideations none   Sensorium:  person, place and time/date   Cognition:  recent and remote memory grossly intact   Consciousness:  alert    Attention: attention span and concentration were age appropriate   Intellect: within normal limits   Insight:  fair   Judgment: fair       Lab Results: I have personally reviewed all pertinent laboratory/tests results  Most Recent Labs: @RESUFAST(WBC,RBC,HGB,HCT,PLT, RBC,RDW,NEUTROABS,SODIUM,K,CL,CO2,BUN,CREATININE,GLUC,GLUF,CALCIUM,AST,ALT,ALKPHOS,TP,ALB,TBILI,CHOLESTEROL,HDL,TRIG,LDLCALC,NONHDLC,VALPROICTOT,CARBAMAZEPIN,LITHIUM,AMMONIA,OPN1NLSCSDBI,FREET4,T3FREE,EXTPREGUR,PREGSERUM,HCG,HCGQUANT,RPR,HGBA1C,EAG)@    Imaging Studies: No results found  EKG/Pathology/Other Studies:   Lab Results   Component Value Date    VENTRATE 100 01/01/2021    ATRIALRATE 100 01/01/2021    PRINT 144 01/01/2021    QRSDINT 74 01/01/2021    QTINT 326 01/01/2021    QTCINT 420 01/01/2021    PAXIS 66 01/01/2021    QRSAXIS 60 01/01/2021    TWAVEAXIS 43 01/01/2021        Code Status: No Order  Advance Directive and Living Will:      Power of :    POLST:      Counseling / Coordination of Care: Total floor / unit time spent today 45 minutes  Greater than 50% of total time was spent with the patient and / or family counseling and / or coordination of care   A description of the counseling / coordination of care: Direct Patient Care, Chart Review, and Documentation

## 2023-03-27 LAB
ATRIAL RATE: 58 BPM
P AXIS: 50 DEGREES
PR INTERVAL: 154 MS
QRS AXIS: 46 DEGREES
QRSD INTERVAL: 72 MS
QT INTERVAL: 420 MS
QTC INTERVAL: 412 MS
T WAVE AXIS: 56 DEGREES
VENTRICULAR RATE: 58 BPM

## 2023-03-29 ENCOUNTER — PATIENT OUTREACH (OUTPATIENT)
Dept: FAMILY MEDICINE CLINIC | Facility: CLINIC | Age: 58
End: 2023-03-29

## 2023-03-29 NOTE — PROGRESS NOTES
"2nd outreach    SW reviewed chart  Pt was in ED, assesed by crisis due to SI and plan, and seen by psychiatry  PT was discharged and per chart, pt 'planned to return to [employer], then plans to go look for employment  Plans to find a job in [employer], or [employer], \"basically anything that's available at this point  \"    MARCE called pt to follow up on how she is doing  SW reached voicemail, left message and call back number     "

## 2023-06-29 DIAGNOSIS — F41.9 ANXIETY: ICD-10-CM

## 2023-06-29 RX ORDER — ALPRAZOLAM 1 MG/1
TABLET ORAL
Qty: 60 TABLET | Refills: 0 | Status: SHIPPED | OUTPATIENT
Start: 2023-06-29

## 2023-07-20 ENCOUNTER — APPOINTMENT (OUTPATIENT)
Dept: LAB | Facility: HOSPITAL | Age: 58
End: 2023-07-20
Attending: INTERNAL MEDICINE
Payer: MEDICARE

## 2023-07-20 ENCOUNTER — TELEPHONE (OUTPATIENT)
Dept: FAMILY MEDICINE CLINIC | Facility: CLINIC | Age: 58
End: 2023-07-20

## 2023-07-20 ENCOUNTER — HOSPITAL ENCOUNTER (EMERGENCY)
Facility: HOSPITAL | Age: 58
Discharge: HOME | End: 2023-07-20
Attending: INTERNAL MEDICINE
Payer: MEDICARE

## 2023-07-20 VITALS
SYSTOLIC BLOOD PRESSURE: 186 MMHG | OXYGEN SATURATION: 100 % | RESPIRATION RATE: 18 BRPM | WEIGHT: 229.28 LBS | DIASTOLIC BLOOD PRESSURE: 82 MMHG | HEART RATE: 88 BPM | TEMPERATURE: 98.4 F | BODY MASS INDEX: 33.86 KG/M2

## 2023-07-20 DIAGNOSIS — R53.83 OTHER FATIGUE: ICD-10-CM

## 2023-07-20 DIAGNOSIS — R23.4 FISSURE IN SKIN OF FOOT: Primary | ICD-10-CM

## 2023-07-20 DIAGNOSIS — E78.5 HYPERLIPIDEMIA, UNSPECIFIED HYPERLIPIDEMIA TYPE: ICD-10-CM

## 2023-07-20 DIAGNOSIS — I10 ESSENTIAL HYPERTENSION: ICD-10-CM

## 2023-07-20 DIAGNOSIS — L40.0 PLAQUE PSORIASIS: ICD-10-CM

## 2023-07-20 DIAGNOSIS — B35.3 FUNGAL INFECTION OF FOOT: ICD-10-CM

## 2023-07-20 LAB
ALBUMIN SERPL BCP-MCNC: 4.2 G/DL (ref 3.5–5)
ALP SERPL-CCNC: 49 U/L (ref 34–104)
ALT SERPL W P-5'-P-CCNC: 19 U/L (ref 7–52)
ANION GAP SERPL CALCULATED.3IONS-SCNC: 9 MMOL/L
AST SERPL W P-5'-P-CCNC: 17 U/L (ref 13–39)
BASOPHILS # BLD AUTO: 0.1 THOUSANDS/ÂΜL (ref 0–0.1)
BASOPHILS NFR BLD AUTO: 1 % (ref 0–1)
BILIRUB SERPL-MCNC: 0.81 MG/DL (ref 0.2–1)
BUN SERPL-MCNC: 12 MG/DL (ref 5–25)
CALCIUM SERPL-MCNC: 9.6 MG/DL (ref 8.4–10.2)
CHLORIDE SERPL-SCNC: 105 MMOL/L (ref 96–108)
CHOLEST SERPL-MCNC: 201 MG/DL
CO2 SERPL-SCNC: 27 MMOL/L (ref 21–32)
CREAT SERPL-MCNC: 0.81 MG/DL (ref 0.6–1.3)
EOSINOPHIL # BLD AUTO: 0.29 THOUSAND/ÂΜL (ref 0–0.61)
EOSINOPHIL NFR BLD AUTO: 2 % (ref 0–6)
ERYTHROCYTE [DISTWIDTH] IN BLOOD BY AUTOMATED COUNT: 13.4 % (ref 11.6–15.1)
ERYTHROCYTE [SEDIMENTATION RATE] IN BLOOD: 16 MM/HOUR (ref 0–29)
GFR SERPL CREATININE-BSD FRML MDRD: 81 ML/MIN/1.73SQ M
GLUCOSE P FAST SERPL-MCNC: 90 MG/DL (ref 65–99)
HCT VFR BLD AUTO: 50.1 % (ref 34.8–46.1)
HDLC SERPL-MCNC: 63 MG/DL
HGB BLD-MCNC: 16.2 G/DL (ref 11.5–15.4)
IMM GRANULOCYTES # BLD AUTO: 0.04 THOUSAND/UL (ref 0–0.2)
IMM GRANULOCYTES NFR BLD AUTO: 0 % (ref 0–2)
LDLC SERPL CALC-MCNC: 124 MG/DL (ref 0–100)
LYMPHOCYTES # BLD AUTO: 2.34 THOUSANDS/ÂΜL (ref 0.6–4.47)
LYMPHOCYTES NFR BLD AUTO: 18 % (ref 14–44)
MCH RBC QN AUTO: 32.5 PG (ref 26.8–34.3)
MCHC RBC AUTO-ENTMCNC: 32.3 G/DL (ref 31.4–37.4)
MCV RBC AUTO: 101 FL (ref 82–98)
MONOCYTES # BLD AUTO: 0.98 THOUSAND/ÂΜL (ref 0.17–1.22)
MONOCYTES NFR BLD AUTO: 7 % (ref 4–12)
NEUTROPHILS # BLD AUTO: 9.48 THOUSANDS/ÂΜL (ref 1.85–7.62)
NEUTS SEG NFR BLD AUTO: 72 % (ref 43–75)
NONHDLC SERPL-MCNC: 138 MG/DL
NRBC BLD AUTO-RTO: 0 /100 WBCS
PLATELET # BLD AUTO: 178 THOUSANDS/UL (ref 149–390)
PMV BLD AUTO: 12.3 FL (ref 8.9–12.7)
POTASSIUM SERPL-SCNC: 4.6 MMOL/L (ref 3.5–5.3)
PROT SERPL-MCNC: 7.7 G/DL (ref 6.4–8.4)
RBC # BLD AUTO: 4.98 MILLION/UL (ref 3.81–5.12)
SODIUM SERPL-SCNC: 141 MMOL/L (ref 135–147)
TRIGL SERPL-MCNC: 68 MG/DL
TSH SERPL DL<=0.05 MIU/L-ACNC: 1.48 UIU/ML (ref 0.45–4.5)
WBC # BLD AUTO: 13.23 THOUSAND/UL (ref 4.31–10.16)

## 2023-07-20 PROCEDURE — 80061 LIPID PANEL: CPT

## 2023-07-20 PROCEDURE — 86480 TB TEST CELL IMMUN MEASURE: CPT

## 2023-07-20 PROCEDURE — 36415 COLL VENOUS BLD VENIPUNCTURE: CPT

## 2023-07-20 PROCEDURE — 99284 EMERGENCY DEPT VISIT MOD MDM: CPT | Performed by: PHYSICIAN ASSISTANT

## 2023-07-20 PROCEDURE — 84443 ASSAY THYROID STIM HORMONE: CPT

## 2023-07-20 PROCEDURE — 85025 COMPLETE CBC W/AUTO DIFF WBC: CPT

## 2023-07-20 PROCEDURE — 86038 ANTINUCLEAR ANTIBODIES: CPT

## 2023-07-20 PROCEDURE — 85652 RBC SED RATE AUTOMATED: CPT

## 2023-07-20 PROCEDURE — 86430 RHEUMATOID FACTOR TEST QUAL: CPT

## 2023-07-20 PROCEDURE — 80053 COMPREHEN METABOLIC PANEL: CPT

## 2023-07-20 PROCEDURE — 99283 EMERGENCY DEPT VISIT LOW MDM: CPT

## 2023-07-20 RX ORDER — PRENATAL VIT 91/IRON/FOLIC/DHA 28-975-200
COMBINATION PACKAGE (EA) ORAL 2 TIMES DAILY
Qty: 45 G | Refills: 0 | Status: SHIPPED | OUTPATIENT
Start: 2023-07-20 | End: 2023-08-10

## 2023-07-20 NOTE — ED PROVIDER NOTES
History  Chief Complaint   Patient presents with   • Foot Pain     Pt reports bilateral foot pain x few weeks, right is worse. Pt has a fungus on her feet, reports she was homeless but is now staying with a friend. Past Medical History: Alcoholism, Anxiety, Depression, Edema, HTN, Hyperlipidemia, Insomnia, Obesity, Onychomycosis, Psoriasis   Past Surgical History: GASTROINTESTINAL SURGERY - due to GI bleed    Pt presents to ED c/o chronic, ongoing fungal infection to nails/bottom of feet, then approx 1 week ago, was mowing the lawn in sneakers and felt sudden to right foot, like she thought she stepped on a nail, but when she checked, she noticed bleeding to cracks in her skin. Pt has had ongoing pain since. Pt has been using lotion and powder without improvement in sx. Pt denies fevers/chills, trouble walking. Prior to Admission Medications   Prescriptions Last Dose Informant Patient Reported? Taking?    ALPRAZolam (XANAX) 1 mg tablet   No No   Sig: TAKE 1 TABLET BY MOUTH TWO TIMES A DAY AS NEEDED FOR ANXIETY   clobetasol (TEMOVATE) 0.05 % external solution   No No   Sig: Apply topically 2 (two) times a day   lisinopril (ZESTRIL) 10 mg tablet   No No   Sig: Take 1 tablet (10 mg total) by mouth daily   metoprolol succinate (TOPROL-XL) 100 mg 24 hr tablet   No No   Sig: take 1 tablet by mouth once daily   nystatin (MYCOSTATIN) powder   No No   Sig: Apply topically 3 (three) times a day   predniSONE 20 mg tablet   No No   Sig: Take 2 tablets daily for 4 days,1 tablet daily for 5 days, and then 1/2 tablet daily for 5 days   triamcinolone (KENALOG) 0.5 % cream   No No   Sig: Apply topically 3 (three) times a day      Facility-Administered Medications: None       Past Medical History:   Diagnosis Date   • Alcoholism ([address])    • Anxiety    • Depression    • Edema    • HBP (high blood pressure)    • Hyperlipidemia    • Hypertension    • Insomnia    • Obesity    • Onychomycosis    • Psoriasis    • Tobacco user        Past Surgical History:   Procedure Laterality Date   • ESOPHAGOGASTRODUODENOSCOPY N/A 10/05/2012    w/ bx   • GASTROINTESTINAL SURGERY      due to GI bleed       Family History   Problem Relation Age of Onset   • No Known Problems Mother    • Heart attack Father    • Diabetes Father    • Hypertension Father    • Alcohol abuse Maternal Grandmother      I have reviewed and agree with the history as documented. E-Cigarette/Vaping   • E-Cigarette Use Never User      E-Cigarette/Vaping Substances   • Nicotine No    • THC No    • CBD No    • Flavoring No    • Other No    • Unknown No      Social History     Tobacco Use   • Smoking status: Every Day     Packs/day: 1.00     Types: Cigarettes   • Smokeless tobacco: Never   • Tobacco comments:     smoked since age 16   Vaping Use   • Vaping Use: Never used   Substance Use Topics   • Alcohol use: Yes     Alcohol/week: 21.0 standard drinks of alcohol     Types: 21 Cans of beer per week   • Drug use: Yes     Types: Marijuana       Review of Systems   Constitutional: Negative for fever. Gastrointestinal: Negative for vomiting. Musculoskeletal: Negative for arthralgias, gait problem, joint swelling and myalgias. Right foot pain     Skin: Positive for rash. Chronic fungal infection   Neurological: Negative for numbness. All other systems reviewed and are negative. Physical Exam  Physical Exam  Constitutional:       Appearance: Normal appearance. She is obese. She is not ill-appearing. HENT:      Head: Normocephalic and atraumatic. Nose: Nose normal.      Mouth/Throat:      Mouth: Mucous membranes are moist.      Pharynx: Oropharynx is clear. Eyes:      Conjunctiva/sclera: Conjunctivae normal.      Pupils: Pupils are equal, round, and reactive to light. Cardiovascular:      Rate and Rhythm: Normal rate. Pulmonary:      Effort: Pulmonary effort is normal. No respiratory distress. Skin:     General: Skin is dry. Findings: No erythema. Comments: + thickened, scaling, rash noted to B/L plantar feet Right>left  + #2, small, linear fissure noted to ball of right foot, mild tenderness, no active bleeding (see pic)  Onychomycosis noted to bilateral feet, all toes   Neurological:      General: No focal deficit present. Mental Status: She is alert. Gait: Gait normal.                 Vital Signs  ED Triage Vitals   Temperature Pulse Respirations Blood Pressure SpO2   07/20/23 1108 07/20/23 1108 07/20/23 1108 07/20/23 1108 07/20/23 1108   98.4 °F (36.9 °C) 88 18 (!) 186/82 100 %      Temp Source Heart Rate Source Patient Position - Orthostatic VS BP Location FiO2 (%)   07/20/23 1108 07/20/23 1108 -- -- --   Oral Monitor         Pain Score       07/20/23 1104       9           Vitals:    07/20/23 1108   BP: (!) 186/82   Pulse: 88         Visual Acuity      ED Medications  Medications - No data to display    Diagnostic Studies  Results Reviewed     None                 No orders to display              Procedures  Procedures         ED Course                               SBIRT 20yo+    Flowsheet Row Most Recent Value   Initial Alcohol Screen: US AUDIT-C     1. How often do you have a drink containing alcohol? 6 Filed at: 07/20/2023 1106   2. How many drinks containing alcohol do you have on a typical day you are drinking? 2 Filed at: 07/20/2023 1106   3a. Male UNDER 65: How often do you have five or more drinks on one occasion? 0 Filed at: 07/20/2023 1106   3b. FEMALE Any Age, or MALE 65+: How often do you have 4 or more drinks on one occassion? 2 Filed at: 07/20/2023 1106   Audit-C Score 10 Filed at: 07/20/2023 1106   Full Alcohol Screen: US AUDIT    4. How often during the last year have you found that you were not able to stop drinking once you had started? 1 Filed at: 07/20/2023 1106   5. How often during past year have you failed to do what was normally expected of you because of drinking?   1 Filed at: 07/20/2023 1106   6. How often in past year have you needed a first drink in the morning to get yourself going after a heavy drinking session? 0 Filed at: 07/20/2023 1106   7. How often in past year have you had feeling of guilt or remorse after drinking? 0 Filed at: 07/20/2023 1106   8. How often in past year have you been unable to remember what happened night before because you had been drinking? 0 Filed at: 07/20/2023 1106   9. Have you or someone else been injured as a result of your drinking? 0 Filed at: 07/20/2023 1106   10. Has a relative, friend, doctor or other health worker been concerned about your drinking and suggested you cut down? 4 Filed at: 07/20/2023 1106   AUDIT Total Score 16 Filed at: 07/20/2023 1106   JON: How many times in the past year have you. .. Used an illegal drug or used a prescription medication for non-medical reasons? Monthly Filed at: 07/20/2023 1106   DAST-10: In the past 12 months. ..    1. Have you used drugs other than those required for medical reasons? 0 Filed at: 07/20/2023 1106   2. Do you use more than one drug at a time? 0 Filed at: 07/20/2023 1106   3. Have you had medical problems as a result of your drug use (e.g., memory loss, hepatitis, convulsions, bleeding, etc.)? 0 Filed at: 07/20/2023 1106   4. Have you had "blackouts" or "flashbacks" as a result of drug use? YesNo 0 Filed at: 07/20/2023 1106   5. Do you ever feel bad or guilty about your drug use? 0 Filed at: 07/20/2023 1106   6. Does your spouse (or parent) ever complain about your involvement with drugs? 0 Filed at: 07/20/2023 1106   7. Have you neglected your family because of your use of drugs? 0 Filed at: 07/20/2023 1106   8. Have you engaged in illegal activities in order to obtain drugs? 0 Filed at: 07/20/2023 1106   9. Have you ever experienced withdrawal symptoms (felt sick) when you stopped taking drugs? 0 Filed at: 07/20/2023 1106   10.  Are you always able to stop using drugs when you want to? 0 Filed at: 07/20/2023 1106   DAST-10 Score 0 Filed at: 07/20/2023 1106                    Medical Decision Making  Pt with chronic fungal infection to feet, + #2 small fissures  Offered Tylenol/Motrin patient deferred  Right foot wound cleaned by nurse, xeroform, bulky gauze dressing placed  Stressed to patient the need for ongoing care and treatment of this chronic infection, recommending soaks, skin removal, and topical antifungals, follow-up with  Podiatrist    Amount and/or Complexity of Data Reviewed  External Data Reviewed: notes. Disposition  Final diagnoses:   Fissure in skin of foot   Fungal infection of foot - B/L, R>L     Time reflects when diagnosis was documented in both MDM as applicable and the Disposition within this note     Time User Action Codes Description Comment    7/20/2023 11:36 AM Adeola Shukri Add [R23.4] Fissure in skin of foot     7/20/2023 11:36 AM Adeola Shukri Add [B35.3] Fungal infection of foot     7/20/2023 11:36 AM Adeola Shukri Modify [B35.3] Fungal infection of foot B/L, R>L      ED Disposition     ED Disposition   Discharge    Condition   Stable    Date/Time   Thu Jul 20, 2023 11:35 AM    Comment   Rebecca Roche discharge to home/self care.                Follow-up Information     Follow up With Specialties Details Why [address], MD Internal Medicine, Pediatrics   [address] [address]  [place] [phone]            Discharge Medication List as of 7/20/2023 11:40 AM      START taking these medications    Details   terbinafine (LamISIL) 1 % cream Apply topically 2 (two) times a day, Starting Thu 7/20/2023, Normal         CONTINUE these medications which have NOT CHANGED    Details   ALPRAZolam (XANAX) 1 mg tablet TAKE 1 TABLET BY MOUTH TWO TIMES A DAY AS NEEDED FOR ANXIETY, Normal      clobetasol (TEMOVATE) 0.05 % external solution Apply topically 2 (two) times a day, Starting Wed 3/15/2023, Normal      lisinopril (ZESTRIL) 10 mg tablet Take 1 tablet (10 mg total) by mouth daily, Starting Wed 3/15/2023, Normal      metoprolol succinate (TOPROL-XL) 100 mg 24 hr tablet take 1 tablet by mouth once daily, Normal      nystatin (MYCOSTATIN) powder Apply topically 3 (three) times a day, Starting Wed 3/15/2023, Normal      predniSONE 20 mg tablet Take 2 tablets daily for 4 days,1 tablet daily for 5 days, and then 1/2 tablet daily for 5 days, Normal      triamcinolone (KENALOG) 0.5 % cream Apply topically 3 (three) times a day, Starting Wed 3/15/2023, Normal             No discharge procedures on file.     PDMP Review       Value Time User    PDMP Reviewed  Yes 11/30/2022 11:04 AM Sandy Storey MD          ED Provider  Electronically Signed by           Paulina Chavez PA-C  07/20/23 8506

## 2023-07-20 NOTE — DISCHARGE INSTRUCTIONS
Use Tylenol every 4 hours or Ibuprofen every 6 hours; you can alternate the 2 medications taking something every 3 hours for pain. Soak foot in warm/soapy water; and use buffing tool to get off dry/dead skin, then apply antifungal medication as directed. Follow-up with your doctor in next few days.    Follow-up with podiatrist

## 2023-07-21 ENCOUNTER — VBI (OUTPATIENT)
Dept: ADMINISTRATIVE | Facility: OTHER | Age: 58
End: 2023-07-21

## 2023-07-21 LAB
ANA SER QL IA: NEGATIVE
GAMMA INTERFERON BACKGROUND BLD IA-ACNC: 0.05 IU/ML
M TB IFN-G BLD-IMP: NEGATIVE
M TB IFN-G CD4+ BCKGRND COR BLD-ACNC: -0.01 IU/ML
M TB IFN-G CD4+ BCKGRND COR BLD-ACNC: -0.01 IU/ML
MITOGEN IGNF BCKGRD COR BLD-ACNC: >10 IU/ML
RHEUMATOID FACT SER QL LA: NEGATIVE

## 2023-07-21 NOTE — TELEPHONE ENCOUNTER
Estrellita Raw    ED Visit Information     Ed visit date: 07/20/2023  Diagnosis Description: Fissure in skin of foot; Fungal infection of foot  In Network? Yes [address]  Discharge status: Home  Discharged with meds ? Yes  Number of ED visits to date: 3  ED Severity:4     Outreach Information    Outreach successful: Yes 1  Date letter mailed:N/A  Date Finalized:07/21/2023    Care Coordination    Follow up appointment with specialilty: yes Follow up with Podiatry   Transportation issues ? No    Value [employer] type: 3 Day Outreach  Emergent necessity warranted by diagnosis: Yes  [facility] PCP: Yes  Transportation: Self Transport  Called PCP first?: No  Practice Contacted Patient ?: No  Pt had ED follow up with pcp/staff ?: No    Patient states she is doing ok today. Just finished soaking her feet. She made a call to schedule follow up appointment with a Podiatrist. She is aware she can call PCP for further concerns/questions. She acknowledges PCP appointment on July 31.

## 2023-08-10 ENCOUNTER — OFFICE VISIT (OUTPATIENT)
Dept: FAMILY MEDICINE CLINIC | Facility: CLINIC | Age: 58
End: 2023-08-10
Payer: MEDICARE

## 2023-08-10 VITALS
BODY MASS INDEX: 33.53 KG/M2 | OXYGEN SATURATION: 98 % | RESPIRATION RATE: 16 BRPM | HEIGHT: 69 IN | TEMPERATURE: 98.3 F | SYSTOLIC BLOOD PRESSURE: 160 MMHG | DIASTOLIC BLOOD PRESSURE: 90 MMHG | HEART RATE: 90 BPM | WEIGHT: 226.38 LBS

## 2023-08-10 DIAGNOSIS — Z23 NEED FOR PNEUMOCOCCAL 20-VALENT CONJUGATE VACCINATION: ICD-10-CM

## 2023-08-10 DIAGNOSIS — L40.0 PLAQUE PSORIASIS: ICD-10-CM

## 2023-08-10 DIAGNOSIS — L84 CALLUS OF FOOT: ICD-10-CM

## 2023-08-10 DIAGNOSIS — F41.9 ANXIETY: ICD-10-CM

## 2023-08-10 DIAGNOSIS — Z53.20 CERVICAL CANCER SCREENING DECLINED: ICD-10-CM

## 2023-08-10 DIAGNOSIS — B35.1 NAIL FUNGUS: ICD-10-CM

## 2023-08-10 DIAGNOSIS — E78.5 HYPERLIPIDEMIA, UNSPECIFIED HYPERLIPIDEMIA TYPE: ICD-10-CM

## 2023-08-10 DIAGNOSIS — Z53.20 LUNG CANCER SCREENING DECLINED BY PATIENT: ICD-10-CM

## 2023-08-10 DIAGNOSIS — F10.21 HISTORY OF ALCOHOLISM (HCC): ICD-10-CM

## 2023-08-10 DIAGNOSIS — Z53.20 COLON CANCER SCREENING DECLINED: ICD-10-CM

## 2023-08-10 DIAGNOSIS — I10 ESSENTIAL HYPERTENSION: Primary | ICD-10-CM

## 2023-08-10 DIAGNOSIS — Z53.20 MAMMOGRAM DECLINED: ICD-10-CM

## 2023-08-10 DIAGNOSIS — B35.3 FUNGAL INFECTION OF FOOT: ICD-10-CM

## 2023-08-10 DIAGNOSIS — Z72.0 TOBACCO USE: ICD-10-CM

## 2023-08-10 DIAGNOSIS — F33.2 SEVERE EPISODE OF RECURRENT MAJOR DEPRESSIVE DISORDER, WITHOUT PSYCHOTIC FEATURES (HCC): ICD-10-CM

## 2023-08-10 PROCEDURE — 99214 OFFICE O/P EST MOD 30 MIN: CPT | Performed by: INTERNAL MEDICINE

## 2023-08-10 PROCEDURE — 90677 PCV20 VACCINE IM: CPT | Performed by: INTERNAL MEDICINE

## 2023-08-10 PROCEDURE — 90471 IMMUNIZATION ADMIN: CPT | Performed by: INTERNAL MEDICINE

## 2023-08-10 RX ORDER — LISINOPRIL 10 MG/1
10 TABLET ORAL DAILY
Qty: 90 TABLET | Refills: 1 | Status: SHIPPED | OUTPATIENT
Start: 2023-08-10

## 2023-08-10 RX ORDER — ALPRAZOLAM 1 MG/1
1 TABLET ORAL 2 TIMES DAILY PRN
Qty: 60 TABLET | Refills: 3 | Status: CANCELLED | OUTPATIENT
Start: 2023-08-10

## 2023-08-10 RX ORDER — PRENATAL VIT 91/IRON/FOLIC/DHA 28-975-200
COMBINATION PACKAGE (EA) ORAL 2 TIMES DAILY
Qty: 45 G | Refills: 3 | Status: CANCELLED | OUTPATIENT
Start: 2023-08-10

## 2023-08-10 RX ORDER — TRIAMCINOLONE ACETONIDE 5 MG/G
CREAM TOPICAL 3 TIMES DAILY
Qty: 30 G | Refills: 5 | Status: CANCELLED | OUTPATIENT
Start: 2023-08-10

## 2023-08-10 RX ORDER — CLOBETASOL PROPIONATE 0.46 MG/ML
SOLUTION TOPICAL 2 TIMES DAILY
Qty: 50 ML | Refills: 5 | Status: CANCELLED | OUTPATIENT
Start: 2023-08-10

## 2023-08-10 RX ORDER — CLOBETASOL PROPIONATE 0.46 MG/ML
SOLUTION TOPICAL 2 TIMES DAILY
Qty: 50 ML | Refills: 5 | Status: SHIPPED | OUTPATIENT
Start: 2023-08-10

## 2023-08-10 RX ORDER — ALPRAZOLAM 1 MG/1
1 TABLET ORAL 2 TIMES DAILY PRN
Qty: 60 TABLET | Refills: 3 | Status: SHIPPED | OUTPATIENT
Start: 2023-08-10

## 2023-08-10 RX ORDER — METOPROLOL SUCCINATE 100 MG/1
TABLET, EXTENDED RELEASE ORAL
Qty: 90 TABLET | Refills: 1 | Status: SHIPPED | OUTPATIENT
Start: 2023-08-10

## 2023-08-10 NOTE — ASSESSMENT & PLAN NOTE
BP elevated upon retake-will restart her meds and encouraged her to cut back on sodium, stop /cut down on beer and cigarettes, continue daily walks-will recheck bp in the next 3-4 months when pt comes in for Annual Physical

## 2023-08-10 NOTE — PROGRESS NOTES
Name: Charlotte Nuñez      : 1965      MRN: [id number]  Encounter Provider: Almas Patton MD  Encounter Date: 8/10/2023   Encounter department: [facility] [address] MEDICINE    Assessment & Plan     1. Essential hypertension  Assessment & Plan:  BP elevated upon retake-will restart her meds and encouraged her to cut back on sodium, stop /cut down on beer and cigarettes, continue daily walks-will recheck bp in the next 3-4 months when pt comes in for Annual Physical    Orders:  -     lisinopril (ZESTRIL) 10 mg tablet; Take 1 tablet (10 mg total) by mouth daily  -     metoprolol succinate (TOPROL-XL) 100 mg 24 hr tablet; take 1 tablet by mouth once daily    2. Nail fungus  -     Ambulatory Referral to Podiatry; Future    3. Plaque psoriasis  Assessment & Plan:  Referral to Derm placed    Orders:  -     Ambulatory Referral to Dermatology; Future  -     lisinopril (ZESTRIL) 10 mg tablet; Take 1 tablet (10 mg total) by mouth daily  -     clobetasol (TEMOVATE) 0.05 % external solution; Apply topically 2 (two) times a day    4. Anxiety  Assessment & Plan: On prn Xanax, and pt signed controlled substance agreement form    Orders:  -     ALPRAZolam (XANAX) 1 mg tablet; Take 1 tablet (1 mg total) by mouth 2 (two) times a day as needed for anxiety    5. Fungal infection of foot  Comments:  B/L, R>L    6. Colon cancer screening declined    7. Mammogram declined    8. Cervical cancer screening declined    9. Lung cancer screening declined by patient    10. Need for pneumococcal 20-valent conjugate vaccination  -     Pneumococcal Conjugate Vaccine 20-valent (Pcv20)    11. Hyperlipidemia, unspecified hyperlipidemia type    12. History of alcoholism ([address] [facility])    13. Tobacco use  Assessment & Plan:  Counseled on tobacco cessation, declines LDCT at this moment      14. Severe episode of recurrent major depressive disorder, without psychotic features ([address] [facility])    15.  Callus of foot  Comments:  She says from walking everywhere/not changing socks-Podiatry referral placed      We talked about a lot of the screening things patient has not done yet, she does not want to do them at the moment, but would like to work on her job and housing situation first, completely understandable to me-she will return in the next 3-4 months so we can readdress things and check her blood pressure at her Annual Physical    Subjective      Eden Jones here for follow up-hx of tobacco use, heavy alcohol use, HTN, anxiety, depression, psoriasis-I have not seen her in awhile. She was homeless and living in a friend's truck for several months, now is staying with a childhood friend of hers, [place]ighton, and has her own room there. She does have a job lined up to start later in August, so she's hoping that goes through. Had to give up her 2 cats, which she's quite sad about but taking things one step at a time-says she has been out of all of her meds for awhile, and is still smoking but less than 1 ppd,is still drinking beer, but not hard liquor    Review of Systems   Constitutional: Negative. HENT: Negative. Respiratory: Negative. Cardiovascular: Negative. Skin: Positive for rash. Foot calluses, nail abnormalities   Neurological: Negative. Psychiatric/Behavioral: Positive for dysphoric mood. The patient is nervous/anxious.         Current Outpatient Medications on File Prior to Visit   Medication Sig   • [DISCONTINUED] ALPRAZolam (XANAX) 1 mg tablet TAKE 1 TABLET BY MOUTH TWO TIMES A DAY AS NEEDED FOR ANXIETY   • [DISCONTINUED] lisinopril (ZESTRIL) 10 mg tablet Take 1 tablet (10 mg total) by mouth daily   • [DISCONTINUED] metoprolol succinate (TOPROL-XL) 100 mg 24 hr tablet take 1 tablet by mouth once daily   • [DISCONTINUED] clobetasol (TEMOVATE) 0.05 % external solution Apply topically 2 (two) times a day (Patient not taking: Reported on 8/10/2023)   • [DISCONTINUED] nystatin (MYCOSTATIN) powder Apply topically 3 (three) times a day (Patient not taking: Reported on 8/10/2023)   • [DISCONTINUED] predniSONE 20 mg tablet Take 2 tablets daily for 4 days,1 tablet daily for 5 days, and then 1/2 tablet daily for 5 days (Patient not taking: Reported on 8/10/2023)   • [DISCONTINUED] terbinafine (LamISIL) 1 % cream Apply topically 2 (two) times a day (Patient not taking: Reported on 8/10/2023)   • [DISCONTINUED] triamcinolone (KENALOG) 0.5 % cream Apply topically 3 (three) times a day (Patient not taking: Reported on 8/10/2023)       Objective     /90   Pulse 90   Temp 98.3 °F (36.8 °C) (Tympanic)   Resp 16   Ht 5' 9" (1.753 m)   Wt 103 kg (226 lb 6 oz)   SpO2 98%   BMI 33.43 kg/m²     Physical Exam  Constitutional:       Appearance: She is obese. HENT:      Head: Normocephalic and atraumatic. Right Ear: External ear normal.      Left Ear: External ear normal.      Nose: Nose normal.      Mouth/Throat:      Mouth: Mucous membranes are moist.   Eyes:      General: No scleral icterus. Extraocular Movements: Extraocular movements intact. Neck:      Vascular: No carotid bruit. Cardiovascular:      Rate and Rhythm: Normal rate and regular rhythm. Heart sounds: No murmur heard. Pulmonary:      Effort: Pulmonary effort is normal.      Breath sounds: Normal breath sounds. Abdominal:      General: There is no distension. Palpations: Abdomen is soft. Tenderness: There is no abdominal tenderness. Musculoskeletal:         General: Normal range of motion. Cervical back: Normal range of motion. Skin:     Capillary Refill: Capillary refill takes less than 2 seconds. Comments: Hardened, yellowish pitted nails and extensive dry callused looking skin on feet b/l   Neurological:      General: No focal deficit present. Mental Status: She is alert and oriented to person, place, and time. Mental status is at baseline. Psychiatric:         Mood and Affect: Mood normal.         Thought Content:  Thought content normal.       0230 Ken Butcher MD     BMI Counseling: Body mass index is 33.43 kg/m². The BMI is above normal. Nutrition recommendations include reducing portion sizes, decreasing overall calorie intake, 3-5 servings of fruits/vegetables daily, reducing fast food intake, consuming healthier snacks, decreasing soda and/or juice intake, moderation in carbohydrate intake, increasing intake of lean protein, reducing intake of saturated fat and trans fat and reducing intake of cholesterol. Exercise recommendations include exercising 3-5 times per week and strength training exercises.

## 2023-11-09 ENCOUNTER — TELEPHONE (OUTPATIENT)
Dept: PAIN MEDICINE | Facility: CLINIC | Age: 58
End: 2023-11-09

## 2023-11-09 NOTE — TELEPHONE ENCOUNTER
Kita molina scheduled for 11/16/23 appointment for a 12:50 pickup LMOM to make patient aware  (Rheumatology appointment)

## 2024-02-17 DIAGNOSIS — I10 ESSENTIAL HYPERTENSION: ICD-10-CM

## 2024-02-17 DIAGNOSIS — F41.9 ANXIETY: ICD-10-CM

## 2024-02-17 NOTE — TELEPHONE ENCOUNTER
Medication Refill Request     Name ALPRAZolam (XANAX)    Dose/Frequency 1 mg tablet / Take 1 tablet (1 mg total) by mouth 2 (two) times a day as needed for anxiety   Quantity 60  Verified pharmacy   [x]  Verified ordering Provider   [x]  Does patient have enough for the next 3 days? Yes [] No [x]    Medication Refill Request     Name metoprolol succinate (TOPROL-XL)    Dose/Frequency 100 mg 24 hr tablet   Quantity 90  Verified pharmacy   [x]  Verified ordering Provider   [x]  Does patient have enough for the next 3 days? Yes [] No [x]

## 2024-02-19 RX ORDER — ALPRAZOLAM 1 MG/1
1 TABLET ORAL 2 TIMES DAILY PRN
Qty: 60 TABLET | Refills: 0 | Status: SHIPPED | OUTPATIENT
Start: 2024-02-19

## 2024-02-19 RX ORDER — METOPROLOL SUCCINATE 100 MG/1
TABLET, EXTENDED RELEASE ORAL
Qty: 90 TABLET | Refills: 0 | Status: SHIPPED | OUTPATIENT
Start: 2024-02-19

## 2024-02-19 NOTE — TELEPHONE ENCOUNTER
Pt schedule an appt to  renew prescription and is asking if we can sent in a temporary refill until she is seen. Her appt is schedule for 2/28, pt would like a call back once the prescription is sent to the pharmacy. Please advise thank you.

## 2024-02-28 ENCOUNTER — TELEPHONE (OUTPATIENT)
Dept: RHEUMATOLOGY | Facility: CLINIC | Age: 59
End: 2024-02-28

## 2024-02-28 NOTE — TELEPHONE ENCOUNTER
Called patient to confirm appointment, unfortunately patient is unable to make there appointment due to having a job interview, patient did want to reschedule unfortunately, patients referral expires before the next available appointment, please advise how to help patient.

## 2024-03-26 DIAGNOSIS — I10 ESSENTIAL HYPERTENSION: ICD-10-CM

## 2024-03-26 DIAGNOSIS — F41.9 ANXIETY: ICD-10-CM

## 2024-03-27 RX ORDER — ALPRAZOLAM 1 MG/1
1 TABLET ORAL 2 TIMES DAILY PRN
Qty: 60 TABLET | Refills: 0 | Status: SHIPPED | OUTPATIENT
Start: 2024-03-27

## 2024-03-27 RX ORDER — METOPROLOL SUCCINATE 100 MG/1
TABLET, EXTENDED RELEASE ORAL
Qty: 90 TABLET | Refills: 0 | Status: SHIPPED | OUTPATIENT
Start: 2024-03-27

## 2024-05-22 ENCOUNTER — TELEPHONE (OUTPATIENT)
Age: 59
End: 2024-05-22

## 2024-05-29 ENCOUNTER — TELEPHONE (OUTPATIENT)
Age: 59
End: 2024-05-29

## 2024-05-29 DIAGNOSIS — I10 ESSENTIAL HYPERTENSION: ICD-10-CM

## 2024-05-29 DIAGNOSIS — F41.9 ANXIETY: ICD-10-CM

## 2024-05-29 NOTE — TELEPHONE ENCOUNTER
Reason for call:   [x] Refill   [] Prior Auth  [] Other:     Office:   [x] PCP/Provider -   [] Specialty/Provider -     Medication:     ALPRAZolam (XANAX) 1 mg tablet       Dose/Frequency: Take 1 tablet (1 mg total) by mouth 2 (two) times a day as needed for anxiety     Quantity: : 60 tablet     Pharmacy: [id number] [address] [facility] [phone]     Does the patient have enough for 3 days?   [] Yes   [x] No - Send as HP to POD

## 2024-05-30 DIAGNOSIS — I10 ESSENTIAL HYPERTENSION: ICD-10-CM

## 2024-05-30 RX ORDER — METOPROLOL SUCCINATE 100 MG/1
TABLET, EXTENDED RELEASE ORAL
Qty: 90 TABLET | Refills: 1 | Status: SHIPPED | OUTPATIENT
Start: 2024-05-30

## 2024-05-30 RX ORDER — METOPROLOL SUCCINATE 100 MG/1
TABLET, EXTENDED RELEASE ORAL
Qty: 90 TABLET | Refills: 0 | Status: SHIPPED | OUTPATIENT
Start: 2024-05-30

## 2024-05-30 NOTE — TELEPHONE ENCOUNTER
Reason for call:   [x] Refill   [] Prior Auth  [] Other:     Office:   [x] PCP/Provider - Josette Tan MD   [] Specialty/Provider -     Medication:     metoprolol succinate (TOPROL-XL) 100 mg 24 hr tablet       Dose/Frequency: take 1 tablet by mouth once daily,     Quantity: 90    Pharmacy: [facility] #[id number] [facility] [address]     Does the patient have enough for 3 days?   [] Yes   [x] No - Send as HP to POD

## 2024-05-30 NOTE — TELEPHONE ENCOUNTER
Patient called to request a refill for their xanax 1 mg advised a refill was requested on 5/29 and is pending approval. Patient verbalized understanding and is in agreement.

## 2024-05-30 NOTE — TELEPHONE ENCOUNTER
Patients Name: Abby De Luna  : 1965  Phone Number: [phone]  Appointment Date: 24  Appointment time: 12:45 pm    Address: [address] [facility] [address]  Drop off Facility/Office Name: Rheumatology [facility]  Drop off  Address: [address], [address], [place], [place], [address]  Special notes/directions for   Note for scheduling team

## 2024-06-04 NOTE — TELEPHONE ENCOUNTER
Pt calls in and wants to confirm that lyft  time is 12:10 informed her that this is correct pt verbalizes understanding  and gives thanks

## 2024-06-05 ENCOUNTER — CONSULT (OUTPATIENT)
Dept: RHEUMATOLOGY | Facility: CLINIC | Age: 59
End: 2024-06-05
Payer: MEDICARE

## 2024-06-05 VITALS
HEART RATE: 73 BPM | WEIGHT: 233 LBS | SYSTOLIC BLOOD PRESSURE: 144 MMHG | OXYGEN SATURATION: 100 % | DIASTOLIC BLOOD PRESSURE: 80 MMHG | HEIGHT: 69 IN | BODY MASS INDEX: 34.51 KG/M2

## 2024-06-05 DIAGNOSIS — L40.0 PLAQUE PSORIASIS: Primary | ICD-10-CM

## 2024-06-05 PROCEDURE — 99244 OFF/OP CNSLTJ NEW/EST MOD 40: CPT | Performed by: STUDENT IN AN ORGANIZED HEALTH CARE EDUCATION/TRAINING PROGRAM

## 2024-06-05 NOTE — PATIENT INSTRUCTIONS
Call central scheduling to schedule a Dermatology appointment    Central scheduling: [phone]

## 2024-06-05 NOTE — PROGRESS NOTES
"Ambulatory Visit  Name: Abby De Luna      : 1965      MRN: [id number]  Encounter Provider: Fazal Donaldson DO  Encounter Date: 2024   Encounter department: [facility] RHEUMATOLOGY [facility]    Reason for referral: \"Possible psoriasis\"    Assessment & Plan   1. Plaque psoriasis  Assessment & Plan:  Plaques seem consistent with psoriasis.  Fairly severe inverse psoriasis pattern.    No signs of inflammatory joint pain, dactylitis, uveitis to suggest psoriatic arthritis    Has not yet been contacted by dermatology to set up appointment, provided her with central scheduling number to get in with them soon as possible    No rheumatology follow-up needed unless inflammatory joint symptoms develop      History of Present Illness       Has been having a rash for almost a year, getting worse and worse and more itchy. Has tried creams which aren't helping. Has it above the gluteal cleft, the back of her head. Hasn't seen a Dermatologist.    No joint pain. No stiffness in the joints. No joint swelling.    Eyes itch sometimes, but not painful. Thinks it's from allergies.    Is a heavy smoker and SOB from that.    Denies:  Fever  Rash other than above  Uveitis  Dactylitis  Dysphagia/odynophagia  CP  Pleurisy  Stomach pain  Constipation/bloating  Hematochezia  Gross hematuria  Raynaud's  Joint issues other than noted above    Review of Systems    Objective     /80   Pulse 73   Ht 5' 9\" (1.753 m)   Wt 106 kg (233 lb)   SpO2 100%   BMI 34.41 kg/m²      General: Well appearing, in no distress.   Eyes: Sclera non-icteric. EOMI  HENT: No oral ulcers. MMM.   Extremities: Warm, well perfused, no edema.   Neuro: Alert and oriented. No gross focal neurological deficits.   Skin: induration and plaque over posterior scalp and gluteal cleft. PASI 7.8  MSK exam: no tenderness to palpation or synovitis any joint     Latest Reference Range & Units 23 10:46   MARY KATE SCREEN Negative  Negative "   RHEUMATOID FACTOR Negative  Negative       Physical Exam  Administrative Statements

## 2024-06-05 NOTE — ASSESSMENT & PLAN NOTE
Plaques seem consistent with psoriasis.  Fairly severe inverse psoriasis pattern.    No signs of inflammatory joint pain, dactylitis, uveitis to suggest psoriatic arthritis    Has not yet been contacted by dermatology to set up appointment, provided her with central scheduling number to get in with them soon as possible    No rheumatology follow-up needed unless inflammatory joint symptoms develop

## 2024-08-06 RX ORDER — ALPRAZOLAM 1 MG/1
1 TABLET ORAL 2 TIMES DAILY PRN
Qty: 60 TABLET | Refills: 0 | Status: SHIPPED | OUTPATIENT
Start: 2024-08-06

## 2024-09-10 ENCOUNTER — HOSPITAL ENCOUNTER (EMERGENCY)
Facility: HOSPITAL | Age: 59
Discharge: HOME | End: 2024-09-10
Attending: EMERGENCY MEDICINE
Payer: MEDICARE

## 2024-09-10 ENCOUNTER — HOSPITAL ENCOUNTER (EMERGENCY)
Facility: HOSPITAL | Age: 59
Discharge: HOME | End: 2024-09-10
Attending: EMERGENCY MEDICINE | Admitting: EMERGENCY MEDICINE
Payer: MEDICARE

## 2024-09-10 VITALS
TEMPERATURE: 98.5 F | DIASTOLIC BLOOD PRESSURE: 109 MMHG | SYSTOLIC BLOOD PRESSURE: 185 MMHG | RESPIRATION RATE: 16 BRPM | HEART RATE: 104 BPM | OXYGEN SATURATION: 100 %

## 2024-09-10 VITALS
TEMPERATURE: 97.8 F | SYSTOLIC BLOOD PRESSURE: 149 MMHG | RESPIRATION RATE: 18 BRPM | HEART RATE: 116 BPM | DIASTOLIC BLOOD PRESSURE: 72 MMHG | OXYGEN SATURATION: 100 %

## 2024-09-10 DIAGNOSIS — Z59.819 HOUSING INSTABILITY: Primary | ICD-10-CM

## 2024-09-10 DIAGNOSIS — M54.50 LOW BACK PAIN: Primary | ICD-10-CM

## 2024-09-10 LAB — ETHANOL EXG-MCNC: 0 MG/DL

## 2024-09-10 PROCEDURE — 99283 EMERGENCY DEPT VISIT LOW MDM: CPT

## 2024-09-10 PROCEDURE — 99284 EMERGENCY DEPT VISIT MOD MDM: CPT | Performed by: EMERGENCY MEDICINE

## 2024-09-10 PROCEDURE — 82075 ASSAY OF BREATH ETHANOL: CPT | Performed by: EMERGENCY MEDICINE

## 2024-09-10 RX ORDER — METHOCARBAMOL 500 MG/1
750 TABLET, FILM COATED ORAL ONCE
Status: COMPLETED | OUTPATIENT
Start: 2024-09-10 | End: 2024-09-10

## 2024-09-10 RX ORDER — LIDOCAINE 50 MG/G
1 PATCH TOPICAL ONCE
Status: DISCONTINUED | OUTPATIENT
Start: 2024-09-10 | End: 2024-09-10 | Stop reason: HOSPADM

## 2024-09-10 RX ORDER — KETOROLAC TROMETHAMINE 30 MG/ML
30 INJECTION, SOLUTION INTRAMUSCULAR; INTRAVENOUS ONCE
Status: COMPLETED | OUTPATIENT
Start: 2024-09-10 | End: 2024-09-10

## 2024-09-10 RX ORDER — METHOCARBAMOL 750 MG/1
750 TABLET, FILM COATED ORAL EVERY 8 HOURS PRN
Qty: 5 TABLET | Refills: 0 | Status: SHIPPED | OUTPATIENT
Start: 2024-09-10 | End: 2024-09-20

## 2024-09-10 RX ORDER — ACETAMINOPHEN 325 MG/1
975 TABLET ORAL ONCE
Status: COMPLETED | OUTPATIENT
Start: 2024-09-10 | End: 2024-09-10

## 2024-09-10 RX ADMIN — METHOCARBAMOL TABLETS 750 MG: 500 TABLET, COATED ORAL at 05:25

## 2024-09-10 RX ADMIN — ACETAMINOPHEN 975 MG: 325 TABLET, FILM COATED ORAL at 05:25

## 2024-09-10 RX ADMIN — KETOROLAC TROMETHAMINE 30 MG: 30 INJECTION, SOLUTION INTRAMUSCULAR at 05:25

## 2024-09-10 RX ADMIN — LIDOCAINE 1 PATCH: 50 PATCH CUTANEOUS at 05:25

## 2024-09-10 SDOH — ECONOMIC STABILITY - HOUSING INSECURITY: HOUSING INSTABILITY UNSPECIFIED: Z59.819

## 2024-09-10 NOTE — ED PROVIDER NOTES
History  Chief Complaint   Patient presents with    Leg Pain     Patient stating walking and stepped wrong and hurt her L leg     Patient is a 59-year-old female seen in the emergency department with concern for low back pain.  Patient states that her left leg gave out while walking this evening, and she developed pain to her bilateral lower back patient notes pain worse with movement/bending.  Patient notes no other injuries.  Patient notes no fever, chest pain, shortness of breath, abdominal pain, weakness, numbness, tingling.  Patient notes no other definite clear exacerbating or alleviating factors for her symptoms.        Prior to Admission Medications   Prescriptions Last Dose Informant Patient Reported? Taking?   ALPRAZolam (XANAX) 1 mg tablet   No No   Sig: Take 1 tablet (1 mg total) by mouth 2 (two) times a day as needed for anxiety   clobetasol (TEMOVATE) 0.05 % external solution   No No   Sig: Apply topically 2 (two) times a day   Patient not taking: Reported on 6/5/2024   lisinopril (ZESTRIL) 10 mg tablet   No No   Sig: Take 1 tablet (10 mg total) by mouth daily   Patient not taking: Reported on 6/5/2024   metoprolol succinate (TOPROL-XL) 100 mg 24 hr tablet   No No   Sig: TAKE 1 TABLET BY MOUTH DAILY   metoprolol succinate (TOPROL-XL) 100 mg 24 hr tablet   No No   Sig: take 1 tablet by mouth once daily   Patient not taking: Reported on 6/5/2024      Facility-Administered Medications: None       Past Medical History:   Diagnosis Date    Alcoholism (HCC)     Anxiety     Depression     Edema     HBP (high blood pressure)     Hyperlipidemia     Hypertension     Insomnia     Obesity     Onychomycosis     Psoriasis     Tobacco user        Past Surgical History:   Procedure Laterality Date    ESOPHAGOGASTRODUODENOSCOPY N/A 10/05/2012    w/ bx    GASTROINTESTINAL SURGERY      due to GI bleed       Family History   Problem Relation Age of Onset    No Known Problems Mother     Heart attack Father     Diabetes  Father     Hypertension Father     Alcohol abuse Maternal Grandmother      I have reviewed and agree with the history as documented.    E-Cigarette/Vaping    E-Cigarette Use Never User      E-Cigarette/Vaping Substances    Nicotine No     THC No     CBD No     Flavoring No     Other No     Unknown No      Social History     Tobacco Use    Smoking status: Every Day     Current packs/day: 1.00     Types: Cigarettes    Smokeless tobacco: Never    Tobacco comments:     smoked since age 14   Vaping Use    Vaping status: Never Used   Substance Use Topics    Alcohol use: Yes     Alcohol/week: 21.0 standard drinks of alcohol     Types: 21 Cans of beer per week    Drug use: Yes     Types: Marijuana, Methamphetamines       Review of Systems   Constitutional:  Negative for chills and fever.   HENT:  Negative for ear pain and sore throat.    Eyes:  Negative for pain and visual disturbance.   Respiratory:  Negative for cough and shortness of breath.    Cardiovascular:  Negative for chest pain and palpitations.   Gastrointestinal:  Negative for abdominal pain and vomiting.   Genitourinary:  Negative for decreased urine volume and difficulty urinating.   Musculoskeletal:  Positive for back pain. Negative for neck stiffness.   Skin:  Negative for color change and rash.   Neurological:  Negative for seizures, syncope, weakness and numbness.   Psychiatric/Behavioral:  Negative for agitation.        Physical Exam  Physical Exam  Vitals and nursing note reviewed.   Constitutional:       General: She is not in acute distress.     Appearance: She is well-developed.   HENT:      Head: Normocephalic and atraumatic.      Right Ear: External ear normal.      Left Ear: External ear normal.      Nose: Nose normal.      Mouth/Throat:      Pharynx: Oropharynx is clear.   Eyes:      General: No scleral icterus.     Conjunctiva/sclera: Conjunctivae normal.   Cardiovascular:      Rate and Rhythm: Tachycardia present.      Comments: well-perfused  extremities  Pulmonary:      Effort: Pulmonary effort is normal. No respiratory distress.   Abdominal:      General: Abdomen is flat. There is no distension.   Musculoskeletal:         General: Tenderness present. No swelling or deformity.      Cervical back: Normal range of motion and neck supple.      Comments: mild tenderness to palpation of bilateral lower back; no midline lumbar spinal tenderness or step-offs noted   Skin:     General: Skin is warm and dry.   Neurological:      General: No focal deficit present.      Mental Status: She is alert.      Cranial Nerves: No cranial nerve deficit.      Sensory: No sensory deficit.   Psychiatric:         Mood and Affect: Mood normal.         Thought Content: Thought content normal.         Vital Signs  ED Triage Vitals   Temperature Pulse Respirations Blood Pressure SpO2   09/10/24 0428 09/10/24 0428 09/10/24 0428 09/10/24 0428 09/10/24 0428   97.8 °F (36.6 °C) (!) 116 18 149/72 100 %      Temp Source Heart Rate Source Patient Position - Orthostatic VS BP Location FiO2 (%)   09/10/24 0428 09/10/24 0428 09/10/24 0428 09/10/24 0428 --   Oral Monitor Lying Left arm       Pain Score       09/10/24 0525       Med Not Given for Pain - for MAR use only           Vitals:    09/10/24 0428   BP: 149/72   Pulse: (!) 116   Patient Position - Orthostatic VS: Lying         Visual Acuity      ED Medications  Medications   lidocaine (LIDODERM) 5 % patch 1 patch (1 patch Topical Medication Applied 9/10/24 0525)   ketorolac (TORADOL) injection 30 mg (30 mg Intramuscular Given 9/10/24 0525)   acetaminophen (TYLENOL) tablet 975 mg (975 mg Oral Given 9/10/24 0525)   methocarbamol (ROBAXIN) tablet 750 mg (750 mg Oral Given 9/10/24 0525)       Diagnostic Studies  Results Reviewed       None                   No orders to display              Procedures  Procedures         ED Course                                 SBIRT 22yo+      Flowsheet Row Most Recent Value   Initial Alcohol Screen: US  AUDIT-C     1. How often do you have a drink containing alcohol? 0 Filed at: 09/10/2024 0428   2. How many drinks containing alcohol do you have on a typical day you are drinking?  0 Filed at: 09/10/2024 0428   3b. FEMALE Any Age, or MALE 65+: How often do you have 4 or more drinks on one occassion? 0 Filed at: 09/10/2024 0428   Audit-C Score 0 Filed at: 09/10/2024 0428   JON: How many times in the past year have you...    Used an illegal drug or used a prescription medication for non-medical reasons? Never Filed at: 09/10/2024 0428                      Medical Decision Making  Patient is a 59-year-old female seen in the emergency department with concern for low back pain.  Patient was treated with medication for symptom control.  Evaluation is not consistent with acute fracture, cauda equina syndrome, or deep space infection.  Evaluation appears to be consistent with low back strain/spasm.  Plan to have patient follow up with PCP/outpatient providers.  Patient stable for discharge home.  Discharge instructions were reviewed with patient.    Problems Addressed:  Low back pain: acute illness or injury    Risk  OTC drugs.  Prescription drug management.                 Disposition  Final diagnoses:   Low back pain     Time reflects when diagnosis was documented in both MDM as applicable and the Disposition within this note       Time User Action Codes Description Comment    9/10/2024  5:18 AM Jose Luis Peterson Add [M54.50] Low back pain           ED Disposition       ED Disposition   Discharge    Condition   Stable    Date/Time   Tue Sep 10, 2024 0518    Comment   Abby De Luna discharge to home/self care.                   Follow-up Information       Follow up With Specialties Details Why Contact Info Additional Information    Josette Tan MD Internal Medicine, Pediatrics Call in 1 day  [address]  [address]  [facility] [address] [phone]       [facility] Physical  Therapy Call in 1 day  [phone]            Discharge Medication List as of 9/10/2024  5:22 AM        START taking these medications    Details   methocarbamol (Robaxin-750) 750 mg tablet Take 1 tablet (750 mg total) by mouth every 8 (eight) hours as needed for muscle spasms for up to 10 days, Starting Tue 9/10/2024, Until Fri 9/20/2024 at 2359, Normal      naproxen (NAPROSYN) 375 mg tablet Take 1 tablet (375 mg total) by mouth every 12 (twelve) hours as needed (pain) for up to 10 days Take with food., Starting Tue 9/10/2024, Until Fri 9/20/2024 at 2359, Normal           CONTINUE these medications which have NOT CHANGED    Details   ALPRAZolam (XANAX) 1 mg tablet Take 1 tablet (1 mg total) by mouth 2 (two) times a day as needed for anxiety, Starting Tue 8/6/2024, Normal      clobetasol (TEMOVATE) 0.05 % external solution Apply topically 2 (two) times a day, Starting Thu 8/10/2023, Normal      lisinopril (ZESTRIL) 10 mg tablet Take 1 tablet (10 mg total) by mouth daily, Starting Thu 8/10/2023, Normal      !! metoprolol succinate (TOPROL-XL) 100 mg 24 hr tablet TAKE 1 TABLET BY MOUTH DAILY, Normal      !! metoprolol succinate (TOPROL-XL) 100 mg 24 hr tablet take 1 tablet by mouth once daily, Normal       !! - Potential duplicate medications found. Please discuss with provider.              PDMP Review         Value Time User    PDMP Reviewed  Yes 11/30/2022 11:04 AM Josette Tan MD            ED Provider  Electronically Signed by             Jose Luis Peterson MD  09/10/24 0571

## 2024-09-11 ENCOUNTER — TELEPHONE (OUTPATIENT)
Dept: PHYSICAL THERAPY | Facility: OTHER | Age: 59
End: 2024-09-11

## 2024-09-11 NOTE — ED NOTES
Requested a Lyft ride for this patient to her ex-girlfriends house. Patient cancelled the Lyft and said that she had a family member coming to pick her up shortly.      Juanita Billy RN  09/11/24 0225

## 2024-09-11 NOTE — ED NOTES
Arrives via EMS with report of anxiety and paranoia surrounding being homeless at least for the night -- pt was reportedly previously staying at [facility] but they no longer have space for her. Pt was seen here early this morning and discharged. CW was able to meet with pt. PT stated that she is here due to multiple reasons. PT spoke about her recent breakup. PT also stated that she is currently homeless and was staying with friends. PT stated that she doesnt want to ask due to her feeling like she is burden on them. 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 Pt stated that from there she went to rehab. Pt stated that she has been in Rehab multiple times. PT stated that her last drink was today at around noon. PT stated that she knows that she has to do which is go to meeting. Pt reports that substance abuse has made her burn bridges with people. Pt doesnt recall being inpatient for mental health at this time. PT stated that she has a psychiatrist with [facility] but no therapist. PT stated that she has a  which is helping her with food and disability. PT reports that she also put in the paperwork to get housing assitance and they are trying to move her up since it is going to get cold soon. Abby denied SI/HI/AH/VH and no self injurious behaviors. PT reports of planning to try to find a job. Pt stated that she is she has an uncle and aunt that live close that she can stay with. PT stated that she doesnt want to burden them but will have no choice. PT reports her sleep and appetite as fair. PT spoke about plans of moving to [place] with another aunt and uncle in the future. PT stated that she wants to go to AA meeting and get a job. PT was provided with resources. PT agreeable to go to family members for the night

## 2024-09-11 NOTE — TELEPHONE ENCOUNTER
Call placed to the patient per [facility] referral.    Patient states she is unable to talk at this time and will call us back if needed. Patient states she has phone number.    Referral Closed.  Will discuss our program and triage if a call back is received.

## 2024-09-11 NOTE — ED PROVIDER NOTES
1. Housing instability      ED Disposition       ED Disposition   Discharge    Condition   Stable    Date/Time   Tue Sep 10, 2024 11:28 PM    Comment   Abby De Luna discharge to home/self care.                   Assessment & Plan       Medical Decision Making  Amount and/or Complexity of Data Reviewed  Labs: ordered.      59-year-old female presenting with concerns about housing instability being worried that she has nowhere to stay tonight because [employer] did not have a spot for her.  Also complaining of some anxiety related to this concern.  Patient was medically cleared and crisis was consulted.  Crisis poke with the patient however the patient was able to arrange to stay at her aunts house tonight.  Resources were provided.  Stable at time of discharge.                 Medications - No data to display    History of Present Illness       HPI    Abby De Luna is a 59 y.o. female who identifies as a female presenting to the Emergency Department for anxiety and housing instability.  No suicidal or homicidal ideation.  No hallucinations.  No medical concerns.    Review of Systems   All other systems reviewed and are negative.          Objective     ED Triage Vitals [09/10/24 2235]   Temperature Pulse Blood Pressure Respirations SpO2 Patient Position - Orthostatic VS   98.5 °F (36.9 °C) 104 (!) 185/109 16 100 % Lying      Temp Source Heart Rate Source BP Location FiO2 (%) Pain Score    Oral -- Right arm -- No Pain        Physical Exam  Constitutional:       General: She is not in acute distress.  HENT:      Head: Normocephalic.      Mouth/Throat:      Mouth: Mucous membranes are moist.   Cardiovascular:      Rate and Rhythm: Normal rate.   Pulmonary:      Effort: Pulmonary effort is normal.   Skin:     General: Skin is dry.   Neurological:      General: No focal deficit present.      Mental Status: She is alert and oriented to person, place, and time.   Psychiatric:         Behavior: Behavior normal.          Labs Reviewed   POCT ALCOHOL BREATH TEST - Normal     No orders to display       Procedures       Loree Allen MD  09/10/24 2818

## 2024-10-15 DIAGNOSIS — F41.9 ANXIETY: ICD-10-CM

## 2024-10-15 DIAGNOSIS — I10 ESSENTIAL HYPERTENSION: ICD-10-CM

## 2024-10-16 RX ORDER — METOPROLOL SUCCINATE 100 MG/1
TABLET, EXTENDED RELEASE ORAL
Qty: 90 TABLET | Refills: 0 | Status: SHIPPED | OUTPATIENT
Start: 2024-10-16

## 2024-10-16 RX ORDER — ALPRAZOLAM 1 MG/1
TABLET ORAL
Qty: 60 TABLET | Refills: 0 | Status: SHIPPED | OUTPATIENT
Start: 2024-10-16

## 2024-11-30 DIAGNOSIS — F41.9 ANXIETY: ICD-10-CM

## 2024-12-05 RX ORDER — ALPRAZOLAM 1 MG/1
TABLET ORAL
Qty: 60 TABLET | Refills: 0 | Status: SHIPPED | OUTPATIENT
Start: 2024-12-05

## 2024-12-17 ENCOUNTER — OFFICE VISIT (OUTPATIENT)
Dept: FAMILY MEDICINE CLINIC | Facility: CLINIC | Age: 59
End: 2024-12-17
Payer: MEDICARE

## 2024-12-17 VITALS
OXYGEN SATURATION: 98 % | HEART RATE: 78 BPM | BODY MASS INDEX: 34.51 KG/M2 | SYSTOLIC BLOOD PRESSURE: 180 MMHG | WEIGHT: 233 LBS | HEIGHT: 69 IN | DIASTOLIC BLOOD PRESSURE: 110 MMHG

## 2024-12-17 DIAGNOSIS — Z72.0 TOBACCO USE: ICD-10-CM

## 2024-12-17 DIAGNOSIS — Z12.2 SCREENING FOR LUNG CANCER: ICD-10-CM

## 2024-12-17 DIAGNOSIS — Z00.00 ANNUAL PHYSICAL EXAM: Primary | ICD-10-CM

## 2024-12-17 DIAGNOSIS — F10.21 HISTORY OF ALCOHOLISM (HCC): ICD-10-CM

## 2024-12-17 DIAGNOSIS — Z11.4 ENCOUNTER FOR SCREENING FOR HIV: ICD-10-CM

## 2024-12-17 DIAGNOSIS — F33.2 SEVERE EPISODE OF RECURRENT MAJOR DEPRESSIVE DISORDER, WITHOUT PSYCHOTIC FEATURES (HCC): ICD-10-CM

## 2024-12-17 DIAGNOSIS — I10 ESSENTIAL HYPERTENSION: ICD-10-CM

## 2024-12-17 DIAGNOSIS — Z11.59 NEED FOR HEPATITIS C SCREENING TEST: ICD-10-CM

## 2024-12-17 DIAGNOSIS — M54.50 LOW BACK PAIN: ICD-10-CM

## 2024-12-17 DIAGNOSIS — Z12.11 COLON CANCER SCREENING: ICD-10-CM

## 2024-12-17 DIAGNOSIS — E78.5 HYPERLIPIDEMIA, UNSPECIFIED HYPERLIPIDEMIA TYPE: ICD-10-CM

## 2024-12-17 DIAGNOSIS — Z12.4 CERVICAL CANCER SCREENING: ICD-10-CM

## 2024-12-17 DIAGNOSIS — Z12.31 ENCOUNTER FOR SCREENING MAMMOGRAM FOR MALIGNANT NEOPLASM OF BREAST: ICD-10-CM

## 2024-12-17 DIAGNOSIS — L40.0 PLAQUE PSORIASIS: ICD-10-CM

## 2024-12-17 DIAGNOSIS — F41.9 ANXIETY: ICD-10-CM

## 2024-12-17 PROCEDURE — 99214 OFFICE O/P EST MOD 30 MIN: CPT | Performed by: INTERNAL MEDICINE

## 2024-12-17 PROCEDURE — 99396 PREV VISIT EST AGE 40-64: CPT | Performed by: INTERNAL MEDICINE

## 2024-12-17 RX ORDER — LISINOPRIL 10 MG/1
10 TABLET ORAL DAILY
Qty: 90 TABLET | Refills: 1 | Status: SHIPPED | OUTPATIENT
Start: 2024-12-17

## 2024-12-17 RX ORDER — METOPROLOL SUCCINATE 100 MG/1
TABLET, EXTENDED RELEASE ORAL
Qty: 90 TABLET | Refills: 0 | Status: SHIPPED | OUTPATIENT
Start: 2024-12-17

## 2024-12-17 RX ORDER — ALPRAZOLAM 1 MG/1
1 TABLET ORAL 2 TIMES DAILY PRN
Qty: 60 TABLET | Refills: 0 | Status: SHIPPED | OUTPATIENT
Start: 2024-12-17

## 2024-12-17 NOTE — ASSESSMENT & PLAN NOTE
On alprazolam BID prn and has been using it as such, contracted for controlled substances  Orders:    ALPRAZolam (XANAX) 1 mg tablet; Take 1 tablet (1 mg total) by mouth 2 (two) times a day as needed for anxiety

## 2024-12-17 NOTE — ASSESSMENT & PLAN NOTE
Has cut back quite a bit, still drinking beer although not daily, is now living with a disabled friend and she is getting paid to help care for her  Orders:    Ambulatory referral to Gastroenterology; Future

## 2024-12-17 NOTE — PROGRESS NOTES
Adult Annual Physical  Name: Abby De Luna      : 1965      MRN: [id number]  Encounter Provider: Josette Tan MD  Encounter Date: 2024   Encounter department: [facility]    Assessment & Plan  Anxiety  On alprazolam BID prn and has been using it as such, contracted for controlled substances  Orders:    ALPRAZolam (XANAX) 1 mg tablet; Take 1 tablet (1 mg total) by mouth 2 (two) times a day as needed for anxiety    Low back pain    Orders:    naproxen (NAPROSYN) 375 mg tablet; Take 1 tablet (375 mg total) by mouth every 12 (twelve) hours as needed (pain) for up to 10 days Take with food.    Plaque psoriasis    Orders:    lisinopril (ZESTRIL) 10 mg tablet; Take 1 tablet (10 mg total) by mouth daily    Essential hypertension  Very high today but she's been out of her medications-will restart metoprolol and lisinopril and advised on diet-she does walk a lot-has also cut back some on drinking alcohol and smoking-labs ordered, and I encouraged her to come back in 3 months so we can look at her bp  Orders:    lisinopril (ZESTRIL) 10 mg tablet; Take 1 tablet (10 mg total) by mouth daily    metoprolol succinate (TOPROL-XL) 100 mg 24 hr tablet; TAKE 1 TABLET BY MOUTH DAILY    CBC and differential; Future    Comprehensive metabolic panel; Future    Lipid panel; Future    Hyperlipidemia, unspecified hyperlipidemia type    Orders:    Lipid panel; Future    TSH, 3rd generation; Future    History of alcoholism (HCC)  Has cut back quite a bit, still drinking beer although not daily, is now living with a disabled friend and she is getting paid to help care for her  Orders:    Ambulatory referral to Gastroenterology; Future    Severe episode of recurrent major depressive disorder, without psychotic features (HCC)           Tobacco use  Counseled on cutting back/cessation, says she has cut down-LDCT ordered for lung cancer screening  Orders:    CT lung screening program;  Future    Annual physical exam         Screening for lung cancer  I discussed with her that she is a candidate for lung cancer CT screening.     The following Shared Decision-Making points were covered:  Benefits of screening were discussed, including the rates of reduction in death from lung cancer and other causes.  Harms of screening were reviewed, including false positive tests, radiation exposure levels, risks of invasive procedures, risks of complications of screening, and risk of overdiagnosis.  I counseled on the importance of adherence to annual lung cancer LDCT screening, impact of co-morbidities, and ability or willingness to undergo diagnosis and treatment.  I counseled on the importance of maintaining abstinence as a former smoker or was counseled on the importance of smoking cessation if a current smoker    Review of Eligibility Criteria: She meets all of the criteria for Lung Cancer Screening.   She is 59 y.o.   She has 20 pack year tobacco history and is a current smoker or has quit within the past 15 years  She presents no signs or symptoms of lung cancer    After discussion, the patient decided to elect lung cancer screening.         Encounter for screening mammogram for malignant neoplasm of breast  Mammogram ordered  Orders:    Mammo screening bilateral w 3d and cad; Future    Colon cancer screening  Referred to GI as I think them doing an EGD might also be a good idea with hx of heavy alcohol use  Orders:    Ambulatory referral to Gastroenterology; Future    Need for hepatitis C screening test    Orders:    Hepatitis C antibody; Future    Encounter for screening for HIV    Orders:    HIV 1/2 AG/AB w Reflex [facility] for 2 yr old and above; Future    Cervical cancer screening    Orders:    Ambulatory Referral to Obstetrics / Gynecology; Future    Immunizations and preventive care screenings were discussed with patient today. Appropriate education was printed on patient's after visit  summary.    Counseling:  Alcohol/drug use: discussed moderation in alcohol intake, the recommendations for healthy alcohol use, and avoidance of illicit drug use.  Dental Health: discussed importance of regular tooth brushing, flossing, and dental visits.  Exercise: the importance of regular exercise/physical activity was discussed. Recommend exercise 3-5 times per week for at least 30 minutes.          History of Present Illness     Adult Annual Physical  Review of Systems   Constitutional: Negative.    HENT: Negative.     Respiratory: Negative.     Cardiovascular: Negative.    Gastrointestinal: Negative.    Musculoskeletal: Negative.    Skin: Negative.    Psychiatric/Behavioral:  Positive for dysphoric mood. Negative for suicidal ideas. The patient is nervous/anxious.      Current Outpatient Medications on File Prior to Visit   Medication Sig Dispense Refill    methocarbamol (Robaxin-750) 750 mg tablet Take 1 tablet (750 mg total) by mouth every 8 (eight) hours as needed for muscle spasms for up to 10 days 5 tablet 0    [DISCONTINUED] ALPRAZolam (XANAX) 1 mg tablet TAKE 1 TABLET(1 MG) BY MOUTH TWICE DAILY AS NEEDED FOR ANXIETY 60 tablet 0    [DISCONTINUED] metoprolol succinate (TOPROL-XL) 100 mg 24 hr tablet TAKE 1 TABLET BY MOUTH DAILY 90 tablet 0    [DISCONTINUED] naproxen (NAPROSYN) 375 mg tablet Take 1 tablet (375 mg total) by mouth every 12 (twelve) hours as needed (pain) for up to 10 days Take with food. 10 tablet 0    clobetasol (TEMOVATE) 0.05 % external solution Apply topically 2 (two) times a day (Patient not taking: Reported on 12/17/2024) 50 mL 5    [DISCONTINUED] lisinopril (ZESTRIL) 10 mg tablet Take 1 tablet (10 mg total) by mouth daily (Patient not taking: Reported on 12/17/2024) 90 tablet 1    [DISCONTINUED] metoprolol succinate (TOPROL-XL) 100 mg 24 hr tablet take 1 tablet by mouth once daily (Patient not taking: Reported on 12/17/2024) 90 tablet 1     No current facility-administered medications  "on file prior to visit.      Social History     Tobacco Use    Smoking status: Every Day     Current packs/day: 1.00     Types: Cigarettes    Smokeless tobacco: Never    Tobacco comments:     smoked since age 14   Vaping Use    Vaping status: Never Used   Substance and Sexual Activity    Alcohol use: Yes     Alcohol/week: 21.0 standard drinks of alcohol     Types: 21 Cans of beer per week    Drug use: Yes     Types: Marijuana, Methamphetamines    Sexual activity: Not on file       Objective   BP (!) 180/110 (BP Location: Left arm, Patient Position: Sitting, Cuff Size: Standard)   Pulse 78   Ht 5' 9\" (1.753 m)   Wt 106 kg (233 lb)   SpO2 98%   BMI 34.41 kg/m²     Physical Exam  Constitutional:       Appearance: Normal appearance. She is obese.   HENT:      Head: Normocephalic and atraumatic.      Right Ear: External ear normal.      Left Ear: External ear normal.      Nose: Nose normal.      Mouth/Throat:      Mouth: Mucous membranes are moist.   Eyes:      General: No scleral icterus.     Extraocular Movements: Extraocular movements intact.      Pupils: Pupils are equal, round, and reactive to light.   Cardiovascular:      Rate and Rhythm: Normal rate and regular rhythm.      Heart sounds: Normal heart sounds.   Pulmonary:      Effort: Pulmonary effort is normal.      Breath sounds: Normal breath sounds.   Abdominal:      Palpations: Abdomen is soft.   Musculoskeletal:         General: Normal range of motion.      Cervical back: Normal range of motion and neck supple.   Skin:     General: Skin is warm.   Neurological:      General: No focal deficit present.      Mental Status: She is alert and oriented to person, place, and time.   Psychiatric:         Mood and Affect: Mood normal.       "

## 2024-12-17 NOTE — ASSESSMENT & PLAN NOTE
Very high today but she's been out of her medications-will restart metoprolol and lisinopril and advised on diet-she does walk a lot-has also cut back some on drinking alcohol and smoking-labs ordered, and I encouraged her to come back in 3 months so we can look at her bp  Orders:    lisinopril (ZESTRIL) 10 mg tablet; Take 1 tablet (10 mg total) by mouth daily    metoprolol succinate (TOPROL-XL) 100 mg 24 hr tablet; TAKE 1 TABLET BY MOUTH DAILY    CBC and differential; Future    Comprehensive metabolic panel; Future    Lipid panel; Future

## 2024-12-17 NOTE — ASSESSMENT & PLAN NOTE
Counseled on cutting back/cessation, says she has cut down-LDCT ordered for lung cancer screening  Orders:    CT lung screening program; Future

## 2025-01-13 DIAGNOSIS — F41.9 ANXIETY: ICD-10-CM

## 2025-01-14 RX ORDER — ALPRAZOLAM 1 MG/1
TABLET ORAL
Qty: 60 TABLET | Refills: 0 | Status: SHIPPED | OUTPATIENT
Start: 2025-01-14

## 2025-02-17 DIAGNOSIS — F41.9 ANXIETY: ICD-10-CM

## 2025-02-17 RX ORDER — ALPRAZOLAM 1 MG/1
TABLET ORAL
Qty: 60 TABLET | Refills: 0 | Status: SHIPPED | OUTPATIENT
Start: 2025-02-17

## 2025-03-04 ENCOUNTER — TELEPHONE (OUTPATIENT)
Age: 60
End: 2025-03-04

## 2025-03-04 NOTE — TELEPHONE ENCOUNTER
Patient called in and was having issues getting her medications delivered.  Called [facility] with the patient on the line.  Assisted with scheduling delivery to the patient.  No further action required at this time.

## 2025-03-17 DIAGNOSIS — F41.9 ANXIETY: ICD-10-CM

## 2025-03-17 RX ORDER — ALPRAZOLAM 1 MG/1
1 TABLET ORAL 2 TIMES DAILY PRN
Qty: 60 TABLET | Refills: 0 | Status: SHIPPED | OUTPATIENT
Start: 2025-03-17

## 2025-03-17 NOTE — TELEPHONE ENCOUNTER
Patient called in requesting a medication refill to be sent to [facility] [address]. Patient is completely out of medication. Please advise as patient does not have MyChart.     She also wanted to let Dr. Tan know she had to cancel her appointment due to losing insurance. She will call [place] medical assistance to make sure everything is good on their end as she was not aware she had it.     Thank you.    Requested Prescriptions     Pending Prescriptions Disp Refills    ALPRAZolam (XANAX) 1 mg tablet 60 tablet 0

## 2025-03-25 ENCOUNTER — TELEPHONE (OUTPATIENT)
Age: 60
End: 2025-03-25

## 2025-03-25 NOTE — TELEPHONE ENCOUNTER
Patient calling stating that her pharmacy will not release her medication to her due to her address change.  They stated they need proof of her new address from the office.  Updated patients address in chart to be able to send pharmacy a demographic sheet on the patient.    Called [facility] in [place] to obtain their fax number. Spoke with pharmacist Bacilio and she was able to assist me by updating the patients address.  She then transferred me to another person in the pharmacy so they are aware of the address update for release of medication. Spoke with Gloria to see if any additional documentation was needed and was told just the updated address was suffice.  They did no need a demographic page.    Address updated

## 2025-03-26 NOTE — TELEPHONE ENCOUNTER
Patient called the [facility] and wanted to follow up on the address situation. Notified patient that the pharmacy was called and the address should have been updated.

## 2025-03-27 ENCOUNTER — TELEPHONE (OUTPATIENT)
Dept: GASTROENTEROLOGY | Facility: CLINIC | Age: 60
End: 2025-03-27

## 2025-03-27 NOTE — TELEPHONE ENCOUNTER
Referral for colon for pt in chart. Lmm for pt to call back. Please update ins info as well. Thanks Mala

## 2025-04-04 NOTE — TELEPHONE ENCOUNTER
Nurse called and spoke jovon Mcgowan at [facility] who stated medication is ready for .If new scripts are send to the pharmacy please have patients new address included.Patient made aware.

## 2025-05-01 DIAGNOSIS — I10 ESSENTIAL HYPERTENSION: ICD-10-CM

## 2025-05-02 RX ORDER — METOPROLOL SUCCINATE 100 MG/1
100 TABLET, EXTENDED RELEASE ORAL DAILY
Qty: 90 TABLET | Refills: 1 | Status: SHIPPED | OUTPATIENT
Start: 2025-05-02

## 2025-05-06 ENCOUNTER — TELEPHONE (OUTPATIENT)
Dept: DERMATOLOGY | Facility: CLINIC | Age: 60
End: 2025-05-06

## 2025-05-06 NOTE — TELEPHONE ENCOUNTER
Called PT to set-up LYFT for apt on 5/13/25-need to complete qualifier. Number in chart not accepting calls    Also called PTs emergency contact Kyra Palomino, [place], [phone]. Number also not accepting calls    Called both number twice in AM & once in PM, neither numbers accepting calls for all 3 call attempts

## 2025-05-09 NOTE — TELEPHONE ENCOUNTER
Called @ 10:25am on 5/9/25 to confirm appt and ask LYFT qualifier questions.. unable to do so as the number is not accepting calls and Carmen contact number not accepting calls either

## 2025-05-29 DIAGNOSIS — F41.9 ANXIETY: ICD-10-CM

## 2025-05-29 NOTE — TELEPHONE ENCOUNTER
Medication Refill Request       Medication: ALPRAZolam (XANAX) 1 mg tablet     Dose/Frequency: Take 1 tablet (1 mg total) by mouth 2 (two) times a day as needed for anxiety     Quantity:  60 tablet     Pharmacy:   [id number] - [place] - [address]        Office:   [x] PCP/Provider - Josette Tan MD   [] Specialty/Provider -     Does the patient have enough for 3 days?   [] Yes   [x] No - Send as HP to POD    Is the patient completely out of the medication or does not have enough until the next business day?  [] Yes - send to [facility] Hub  [x] No - Send as HP to POD

## 2025-05-30 RX ORDER — ALPRAZOLAM 1 MG/1
1 TABLET ORAL 2 TIMES DAILY PRN
Qty: 60 TABLET | Refills: 0 | Status: SHIPPED | OUTPATIENT
Start: 2025-05-30

## 2025-06-19 ENCOUNTER — TELEPHONE (OUTPATIENT)
Age: 60
End: 2025-06-19

## 2025-06-19 NOTE — TELEPHONE ENCOUNTER
Patient has not taking Blood pressure medication in 3 days Can not afford them Is requesting samples Per office they do not have samples any longer